# Patient Record
Sex: FEMALE | Race: WHITE | Employment: UNEMPLOYED | URBAN - METROPOLITAN AREA
[De-identification: names, ages, dates, MRNs, and addresses within clinical notes are randomized per-mention and may not be internally consistent; named-entity substitution may affect disease eponyms.]

---

## 2019-12-18 ENCOUNTER — HOSPITAL ENCOUNTER (OUTPATIENT)
Dept: GENERAL RADIOLOGY | Age: 74
Discharge: HOME | End: 2019-12-18
Attending: FAMILY MEDICINE
Payer: COMMERCIAL

## 2019-12-18 DIAGNOSIS — R05.9 COUGH: ICD-10-CM

## 2019-12-18 PROCEDURE — 71046 X-RAY EXAM CHEST 2 VIEWS: CPT

## 2021-07-22 PROBLEM — D68.69 SECONDARY HYPERCOAGULABLE STATE (HCC): Status: ACTIVE | Noted: 2021-07-22

## 2021-07-22 PROBLEM — D69.2 SENILE PURPURA (HCC): Status: ACTIVE | Noted: 2021-07-22

## 2022-03-19 PROBLEM — D68.69 SECONDARY HYPERCOAGULABLE STATE (HCC): Status: ACTIVE | Noted: 2021-07-22

## 2022-03-19 PROBLEM — D69.2 SENILE PURPURA: Status: ACTIVE | Noted: 2021-07-22

## 2022-06-07 PROBLEM — D69.2 SENILE PURPURA (HCC): Status: RESOLVED | Noted: 2021-07-22 | Resolved: 2022-06-07

## 2022-06-07 PROBLEM — D68.69 SECONDARY HYPERCOAGULABLE STATE (HCC): Status: RESOLVED | Noted: 2021-07-22 | Resolved: 2022-06-07

## 2022-06-07 PROBLEM — D69.2 SENILE PURPURA: Status: RESOLVED | Noted: 2021-07-22 | Resolved: 2022-06-07

## 2023-07-26 PROBLEM — D69.2 SENILE PURPURA: Status: ACTIVE | Noted: 2021-07-22

## 2025-02-10 ENCOUNTER — HOSPITAL ENCOUNTER (OUTPATIENT)
Facility: HOSPITAL | Age: 80
Discharge: HOME | End: 2025-02-13
Payer: COMMERCIAL

## 2025-02-10 DIAGNOSIS — R07.81 PLEURITIC CHEST PAIN: ICD-10-CM

## 2025-02-10 PROCEDURE — 71046 X-RAY EXAM CHEST 2 VIEWS: CPT

## 2025-02-13 NOTE — RESULT ENCOUNTER NOTE
Feels much better on the Abx and low dose prednisone  Will repeat cxr in 3-4 weeks.   If changes not improved will get ct

## 2025-02-27 ENCOUNTER — HOSPITAL ENCOUNTER (OUTPATIENT)
Facility: HOSPITAL | Age: 80
Discharge: HOME | End: 2025-02-27
Payer: COMMERCIAL

## 2025-02-27 DIAGNOSIS — J18.9 PNEUMONIA OF RIGHT MIDDLE LOBE DUE TO INFECTIOUS ORGANISM: ICD-10-CM

## 2025-02-27 PROCEDURE — 71046 X-RAY EXAM CHEST 2 VIEWS: CPT

## 2025-07-29 ENCOUNTER — APPOINTMENT (OUTPATIENT)
Facility: HOSPITAL | Age: 80
DRG: 024 | End: 2025-07-29
Payer: COMMERCIAL

## 2025-07-29 ENCOUNTER — HOSPITAL ENCOUNTER (INPATIENT)
Facility: HOSPITAL | Age: 80
LOS: 2 days | Discharge: HOME | DRG: 024 | End: 2025-07-31
Attending: EMERGENCY MEDICINE | Admitting: STUDENT IN AN ORGANIZED HEALTH CARE EDUCATION/TRAINING PROGRAM
Payer: COMMERCIAL

## 2025-07-29 ENCOUNTER — HOSPITAL ENCOUNTER (INPATIENT)
Facility: HOSPITAL | Age: 80
Discharge: HOME | DRG: 024 | End: 2025-08-01
Attending: PSYCHIATRY & NEUROLOGY
Payer: COMMERCIAL

## 2025-07-29 ENCOUNTER — ANESTHESIA EVENT (OUTPATIENT)
Facility: HOSPITAL | Age: 80
DRG: 024 | End: 2025-07-29
Payer: COMMERCIAL

## 2025-07-29 ENCOUNTER — ANESTHESIA (OUTPATIENT)
Facility: HOSPITAL | Age: 80
DRG: 024 | End: 2025-07-29
Payer: COMMERCIAL

## 2025-07-29 VITALS
DIASTOLIC BLOOD PRESSURE: 66 MMHG | RESPIRATION RATE: 12 BRPM | TEMPERATURE: 97.7 F | OXYGEN SATURATION: 93 % | SYSTOLIC BLOOD PRESSURE: 106 MMHG | HEART RATE: 59 BPM

## 2025-07-29 DIAGNOSIS — I63.9 STROKE (HCC): ICD-10-CM

## 2025-07-29 DIAGNOSIS — I63.411 CEREBROVASCULAR ACCIDENT (CVA) DUE TO EMBOLISM OF RIGHT MIDDLE CEREBRAL ARTERY (HCC): ICD-10-CM

## 2025-07-29 DIAGNOSIS — I63.9 CEREBROVASCULAR ACCIDENT (CVA), UNSPECIFIED MECHANISM (HCC): Primary | ICD-10-CM

## 2025-07-29 PROBLEM — I61.9 CVA (CEREBROVASCULAR ACCIDENT DUE TO INTRACEREBRAL HEMORRHAGE) (HCC): Status: ACTIVE | Noted: 2025-07-29

## 2025-07-29 LAB
ALBUMIN SERPL-MCNC: 3.4 G/DL (ref 3.5–5.2)
ALBUMIN/GLOB SERPL: 1.2 (ref 1.1–2.2)
ALP SERPL-CCNC: ABNORMAL U/L (ref 35–104)
ALT SERPL-CCNC: ABNORMAL U/L (ref 10–50)
ANION GAP SERPL CALC-SCNC: 10 MMOL/L (ref 2–14)
AST SERPL-CCNC: ABNORMAL U/L (ref 10–35)
BASOPHILS # BLD: 0.03 K/UL (ref 0–0.1)
BASOPHILS NFR BLD: 0.4 % (ref 0–1)
BILIRUB SERPL-MCNC: 0.5 MG/DL (ref 0–1.2)
BUN SERPL-MCNC: 18 MG/DL (ref 8–23)
BUN/CREAT SERPL: 22 (ref 12–20)
CALCIUM SERPL-MCNC: 8.6 MG/DL (ref 8.8–10.2)
CHLORIDE SERPL-SCNC: 101 MMOL/L (ref 98–107)
CO2 SERPL-SCNC: 23 MMOL/L (ref 20–29)
COMMENT:: NORMAL
COMMENT:: NORMAL
CREAT SERPL-MCNC: 0.82 MG/DL (ref 0.6–1)
DIFFERENTIAL METHOD BLD: ABNORMAL
EOSINOPHIL # BLD: 0.08 K/UL (ref 0–0.4)
EOSINOPHIL NFR BLD: 1.1 % (ref 0–7)
ERYTHROCYTE [DISTWIDTH] IN BLOOD BY AUTOMATED COUNT: 12.6 % (ref 11.5–14.5)
GLOBULIN SER CALC-MCNC: 2.8 G/DL (ref 2–4)
GLUCOSE SERPL-MCNC: 122 MG/DL (ref 65–100)
HCT VFR BLD AUTO: 39.9 % (ref 35–47)
HGB BLD-MCNC: 13.1 G/DL (ref 11.5–16)
IMM GRANULOCYTES # BLD AUTO: 0.03 K/UL (ref 0–0.04)
IMM GRANULOCYTES NFR BLD AUTO: 0.4 % (ref 0–0.5)
INR PPP: 1 (ref 0.9–1.1)
LYMPHOCYTES # BLD: 2.78 K/UL (ref 0.8–3.5)
LYMPHOCYTES NFR BLD: 37.8 % (ref 12–49)
MCH RBC QN AUTO: 33.9 PG (ref 26–34)
MCHC RBC AUTO-ENTMCNC: 32.8 G/DL (ref 30–36.5)
MCV RBC AUTO: 103.4 FL (ref 80–99)
MONOCYTES # BLD: 0.49 K/UL (ref 0–1)
MONOCYTES NFR BLD: 6.7 % (ref 5–13)
NEUTS SEG # BLD: 3.94 K/UL (ref 1.8–8)
NEUTS SEG NFR BLD: 53.6 % (ref 32–75)
NRBC # BLD: 0 K/UL (ref 0–0.01)
NRBC BLD-RTO: 0 PER 100 WBC
PLATELET # BLD AUTO: 204 K/UL (ref 150–400)
PMV BLD AUTO: 9.4 FL (ref 8.9–12.9)
POTASSIUM SERPL-SCNC: ABNORMAL MMOL/L (ref 3.5–5.1)
PROT SERPL-MCNC: 6.1 G/DL (ref 6.4–8.3)
PROTHROMBIN TIME: 10.8 SEC (ref 9.2–11.2)
RBC # BLD AUTO: 3.86 M/UL (ref 3.8–5.2)
SODIUM SERPL-SCNC: 134 MMOL/L (ref 136–145)
SPECIMEN HOLD: NORMAL
SPECIMEN HOLD: NORMAL
TROPONIN T SERPL HS-MCNC: 54.4 NG/L (ref 0–14)
WBC # BLD AUTO: 7.4 K/UL (ref 3.6–11)

## 2025-07-29 PROCEDURE — 6360000004 HC RX CONTRAST MEDICATION: Performed by: PSYCHIATRY & NEUROLOGY

## 2025-07-29 PROCEDURE — 96374 THER/PROPH/DIAG INJ IV PUSH: CPT

## 2025-07-29 PROCEDURE — 71045 X-RAY EXAM CHEST 1 VIEW: CPT

## 2025-07-29 PROCEDURE — 84484 ASSAY OF TROPONIN QUANT: CPT

## 2025-07-29 PROCEDURE — 6360000004 HC RX CONTRAST MEDICATION: Performed by: EMERGENCY MEDICINE

## 2025-07-29 PROCEDURE — 2580000003 HC RX 258: Performed by: EMERGENCY MEDICINE

## 2025-07-29 PROCEDURE — 6360000002 HC RX W HCPCS

## 2025-07-29 PROCEDURE — B31R1ZZ FLUOROSCOPY OF INTRACRANIAL ARTERIES USING LOW OSMOLAR CONTRAST: ICD-10-PCS | Performed by: PSYCHIATRY & NEUROLOGY

## 2025-07-29 PROCEDURE — 70450 CT HEAD/BRAIN W/O DYE: CPT

## 2025-07-29 PROCEDURE — 85610 PROTHROMBIN TIME: CPT

## 2025-07-29 PROCEDURE — 2500000003 HC RX 250 WO HCPCS: Performed by: EMERGENCY MEDICINE

## 2025-07-29 PROCEDURE — 3E03317 INTRODUCTION OF OTHER THROMBOLYTIC INTO PERIPHERAL VEIN, PERCUTANEOUS APPROACH: ICD-10-PCS | Performed by: HOSPITALIST

## 2025-07-29 PROCEDURE — 4A03X5D MEASUREMENT OF ARTERIAL FLOW, INTRACRANIAL, EXTERNAL APPROACH: ICD-10-PCS | Performed by: HOSPITALIST

## 2025-07-29 PROCEDURE — 6360000002 HC RX W HCPCS: Performed by: EMERGENCY MEDICINE

## 2025-07-29 PROCEDURE — 61645 PERQ ART M-THROMBECT &/NFS: CPT | Performed by: PSYCHIATRY & NEUROLOGY

## 2025-07-29 PROCEDURE — 3700000001 HC ADD 15 MINUTES (ANESTHESIA): Performed by: PSYCHIATRY & NEUROLOGY

## 2025-07-29 PROCEDURE — 36217 PLACE CATHETER IN ARTERY: CPT

## 2025-07-29 PROCEDURE — 80053 COMPREHEN METABOLIC PANEL: CPT

## 2025-07-29 PROCEDURE — 6360000002 HC RX W HCPCS: Performed by: NURSE ANESTHETIST, CERTIFIED REGISTERED

## 2025-07-29 PROCEDURE — 85025 COMPLETE CBC W/AUTO DIFF WBC: CPT

## 2025-07-29 PROCEDURE — 3700000000 HC ANESTHESIA ATTENDED CARE: Performed by: PSYCHIATRY & NEUROLOGY

## 2025-07-29 PROCEDURE — 0042T CT BRAIN PERFUSION: CPT

## 2025-07-29 PROCEDURE — C1769 GUIDE WIRE: HCPCS

## 2025-07-29 PROCEDURE — 6360000002 HC RX W HCPCS: Performed by: PSYCHIATRY & NEUROLOGY

## 2025-07-29 PROCEDURE — 99285 EMERGENCY DEPT VISIT HI MDM: CPT

## 2025-07-29 PROCEDURE — 2580000003 HC RX 258: Performed by: PSYCHIATRY & NEUROLOGY

## 2025-07-29 PROCEDURE — 99223 1ST HOSP IP/OBS HIGH 75: CPT | Performed by: PSYCHIATRY & NEUROLOGY

## 2025-07-29 PROCEDURE — 93005 ELECTROCARDIOGRAM TRACING: CPT

## 2025-07-29 PROCEDURE — 70498 CT ANGIOGRAPHY NECK: CPT

## 2025-07-29 PROCEDURE — 2000000000 HC ICU R&B

## 2025-07-29 PROCEDURE — 03CG3ZZ EXTIRPATION OF MATTER FROM INTRACRANIAL ARTERY, PERCUTANEOUS APPROACH: ICD-10-PCS | Performed by: PSYCHIATRY & NEUROLOGY

## 2025-07-29 PROCEDURE — 92977 HC THROMBOLYSIS/CORONARY: CPT

## 2025-07-29 RX ORDER — ONDANSETRON 2 MG/ML
INJECTION INTRAMUSCULAR; INTRAVENOUS
Status: DISCONTINUED | OUTPATIENT
Start: 2025-07-29 | End: 2025-07-29 | Stop reason: SDUPTHER

## 2025-07-29 RX ORDER — SODIUM CHLORIDE 9 MG/ML
INJECTION, SOLUTION INTRAVENOUS PRN
Status: DISCONTINUED | OUTPATIENT
Start: 2025-07-29 | End: 2025-07-31 | Stop reason: HOSPADM

## 2025-07-29 RX ORDER — ONDANSETRON 2 MG/ML
4 INJECTION INTRAMUSCULAR; INTRAVENOUS EVERY 6 HOURS PRN
Status: DISCONTINUED | OUTPATIENT
Start: 2025-07-29 | End: 2025-07-31 | Stop reason: HOSPADM

## 2025-07-29 RX ORDER — ENOXAPARIN SODIUM 100 MG/ML
30 INJECTION SUBCUTANEOUS DAILY
Status: DISCONTINUED | OUTPATIENT
Start: 2025-07-30 | End: 2025-07-31 | Stop reason: HOSPADM

## 2025-07-29 RX ORDER — ASPIRIN 81 MG/1
81 TABLET, CHEWABLE ORAL DAILY
Status: DISCONTINUED | OUTPATIENT
Start: 2025-07-30 | End: 2025-07-31 | Stop reason: HOSPADM

## 2025-07-29 RX ORDER — SODIUM CHLORIDE 9 MG/ML
INJECTION, SOLUTION INTRAVENOUS CONTINUOUS
Status: DISCONTINUED | OUTPATIENT
Start: 2025-07-29 | End: 2025-07-29

## 2025-07-29 RX ORDER — SODIUM CHLORIDE 0.9 % (FLUSH) 0.9 %
5-40 SYRINGE (ML) INJECTION EVERY 12 HOURS SCHEDULED
Status: DISCONTINUED | OUTPATIENT
Start: 2025-07-29 | End: 2025-07-30 | Stop reason: SDUPTHER

## 2025-07-29 RX ORDER — LIDOCAINE HYDROCHLORIDE 10 MG/ML
INJECTION, SOLUTION EPIDURAL; INFILTRATION; INTRACAUDAL; PERINEURAL PRN
Status: COMPLETED | OUTPATIENT
Start: 2025-07-29 | End: 2025-07-29

## 2025-07-29 RX ORDER — IOPAMIDOL 755 MG/ML
100 INJECTION, SOLUTION INTRAVASCULAR
Status: DISCONTINUED | OUTPATIENT
Start: 2025-07-29 | End: 2025-07-31 | Stop reason: HOSPADM

## 2025-07-29 RX ORDER — IOPAMIDOL 612 MG/ML
INJECTION, SOLUTION INTRAVASCULAR PRN
Status: COMPLETED | OUTPATIENT
Start: 2025-07-29 | End: 2025-07-29

## 2025-07-29 RX ORDER — ONDANSETRON 2 MG/ML
4 INJECTION INTRAMUSCULAR; INTRAVENOUS ONCE
Status: COMPLETED | OUTPATIENT
Start: 2025-07-29 | End: 2025-07-29

## 2025-07-29 RX ORDER — SODIUM CHLORIDE 0.9 % (FLUSH) 0.9 %
10 SYRINGE (ML) INJECTION ONCE
Status: COMPLETED | OUTPATIENT
Start: 2025-07-29 | End: 2025-07-29

## 2025-07-29 RX ORDER — SODIUM CHLORIDE 0.9 % (FLUSH) 0.9 %
5-40 SYRINGE (ML) INJECTION PRN
Status: DISCONTINUED | OUTPATIENT
Start: 2025-07-29 | End: 2025-07-31 | Stop reason: HOSPADM

## 2025-07-29 RX ORDER — POLYETHYLENE GLYCOL 3350 17 G/17G
17 POWDER, FOR SOLUTION ORAL DAILY PRN
Status: DISCONTINUED | OUTPATIENT
Start: 2025-07-29 | End: 2025-07-31 | Stop reason: HOSPADM

## 2025-07-29 RX ORDER — SODIUM CHLORIDE 0.9 % (FLUSH) 0.9 %
5-40 SYRINGE (ML) INJECTION PRN
Status: DISCONTINUED | OUTPATIENT
Start: 2025-07-29 | End: 2025-07-30 | Stop reason: SDUPTHER

## 2025-07-29 RX ORDER — IOPAMIDOL 755 MG/ML
100 INJECTION, SOLUTION INTRAVASCULAR
Status: COMPLETED | OUTPATIENT
Start: 2025-07-29 | End: 2025-07-29

## 2025-07-29 RX ORDER — ASPIRIN 300 MG/1
300 SUPPOSITORY RECTAL DAILY
Status: DISCONTINUED | OUTPATIENT
Start: 2025-07-30 | End: 2025-07-31 | Stop reason: HOSPADM

## 2025-07-29 RX ORDER — 0.9 % SODIUM CHLORIDE 0.9 %
1000 INTRAVENOUS SOLUTION INTRAVENOUS ONCE
Status: COMPLETED | OUTPATIENT
Start: 2025-07-29 | End: 2025-07-29

## 2025-07-29 RX ORDER — FENTANYL CITRATE 50 UG/ML
INJECTION, SOLUTION INTRAMUSCULAR; INTRAVENOUS
Status: DISCONTINUED | OUTPATIENT
Start: 2025-07-29 | End: 2025-07-29 | Stop reason: SDUPTHER

## 2025-07-29 RX ORDER — ONDANSETRON 2 MG/ML
4 INJECTION INTRAMUSCULAR; INTRAVENOUS EVERY 6 HOURS PRN
Status: DISCONTINUED | OUTPATIENT
Start: 2025-07-29 | End: 2025-07-30 | Stop reason: SDUPTHER

## 2025-07-29 RX ORDER — ONDANSETRON 2 MG/ML
4 INJECTION INTRAMUSCULAR; INTRAVENOUS
Status: DISCONTINUED | OUTPATIENT
Start: 2025-07-29 | End: 2025-07-29 | Stop reason: SDUPTHER

## 2025-07-29 RX ORDER — ATORVASTATIN CALCIUM 40 MG/1
80 TABLET, FILM COATED ORAL NIGHTLY
Status: DISCONTINUED | OUTPATIENT
Start: 2025-07-30 | End: 2025-07-31 | Stop reason: HOSPADM

## 2025-07-29 RX ORDER — SODIUM CHLORIDE 0.9 % (FLUSH) 0.9 %
5-40 SYRINGE (ML) INJECTION EVERY 12 HOURS SCHEDULED
Status: DISCONTINUED | OUTPATIENT
Start: 2025-07-30 | End: 2025-07-31 | Stop reason: HOSPADM

## 2025-07-29 RX ORDER — ONDANSETRON 4 MG/1
4 TABLET, ORALLY DISINTEGRATING ORAL EVERY 8 HOURS PRN
Status: DISCONTINUED | OUTPATIENT
Start: 2025-07-29 | End: 2025-07-31 | Stop reason: HOSPADM

## 2025-07-29 RX ADMIN — FENTANYL CITRATE 25 MCG: 50 INJECTION INTRAMUSCULAR; INTRAVENOUS at 21:25

## 2025-07-29 RX ADMIN — Medication 12 MG: at 20:37

## 2025-07-29 RX ADMIN — SODIUM CHLORIDE, PRESERVATIVE FREE 10 ML: 5 INJECTION INTRAVENOUS at 20:38

## 2025-07-29 RX ADMIN — ONDANSETRON 4 MG: 2 INJECTION, SOLUTION INTRAMUSCULAR; INTRAVENOUS at 20:45

## 2025-07-29 RX ADMIN — HEPARIN SODIUM 100 ML: 1000 INJECTION INTRAVENOUS; SUBCUTANEOUS at 21:29

## 2025-07-29 RX ADMIN — LIDOCAINE HYDROCHLORIDE 6 ML: 10 INJECTION, SOLUTION EPIDURAL; INFILTRATION; INTRACAUDAL; PERINEURAL at 21:41

## 2025-07-29 RX ADMIN — IOPAMIDOL 45 ML: 612 INJECTION, SOLUTION INTRAVENOUS at 21:39

## 2025-07-29 RX ADMIN — Medication 10 ML: at 20:36

## 2025-07-29 RX ADMIN — SODIUM CHLORIDE 600 ML: 9 INJECTION, SOLUTION INTRAVENOUS at 21:21

## 2025-07-29 RX ADMIN — ONDANSETRON 4 MG: 2 INJECTION, SOLUTION INTRAMUSCULAR; INTRAVENOUS at 23:21

## 2025-07-29 RX ADMIN — IOPAMIDOL 40 ML: 755 INJECTION, SOLUTION INTRAVENOUS at 20:28

## 2025-07-29 RX ADMIN — SODIUM CHLORIDE: 9 INJECTION, SOLUTION INTRAVENOUS at 21:21

## 2025-07-29 RX ADMIN — FENTANYL CITRATE 25 MCG: 50 INJECTION INTRAMUSCULAR; INTRAVENOUS at 21:36

## 2025-07-29 RX ADMIN — IOPAMIDOL 80 ML: 755 INJECTION, SOLUTION INTRAVENOUS at 20:28

## 2025-07-29 RX ADMIN — ONDANSETRON 4 MG: 2 INJECTION INTRAMUSCULAR; INTRAVENOUS at 22:00

## 2025-07-29 ASSESSMENT — PAIN - FUNCTIONAL ASSESSMENT: PAIN_FUNCTIONAL_ASSESSMENT: ADULT NONVERBAL PAIN SCALE (NPVS)

## 2025-07-30 ENCOUNTER — APPOINTMENT (OUTPATIENT)
Facility: HOSPITAL | Age: 80
DRG: 024 | End: 2025-07-30
Payer: COMMERCIAL

## 2025-07-30 ENCOUNTER — APPOINTMENT (OUTPATIENT)
Facility: HOSPITAL | Age: 80
DRG: 024 | End: 2025-07-30
Attending: STUDENT IN AN ORGANIZED HEALTH CARE EDUCATION/TRAINING PROGRAM
Payer: COMMERCIAL

## 2025-07-30 LAB
ANION GAP SERPL CALC-SCNC: 11 MMOL/L (ref 2–14)
ANION GAP SERPL CALC-SCNC: 15 MMOL/L (ref 2–14)
BUN SERPL-MCNC: 14 MG/DL (ref 8–23)
BUN SERPL-MCNC: 15 MG/DL (ref 8–23)
BUN/CREAT SERPL: 19 (ref 12–20)
CALCIUM SERPL-MCNC: 8.7 MG/DL (ref 8.8–10.2)
CALCIUM SERPL-MCNC: 9.3 MG/DL (ref 8.8–10.2)
CHLORIDE SERPL-SCNC: 102 MMOL/L (ref 98–107)
CHLORIDE SERPL-SCNC: 99 MMOL/L (ref 98–107)
CHOLEST SERPL-MCNC: 163 MG/DL (ref 0–200)
CO2 SERPL-SCNC: 19 MMOL/L (ref 20–29)
CO2 SERPL-SCNC: 25 MMOL/L (ref 20–29)
CREAT SERPL-MCNC: 0.68 MG/DL (ref 0.6–1)
CREAT SERPL-MCNC: 0.71 MG/DL (ref 0.6–1)
ECHO AO ROOT DIAM: 3 CM
ECHO AO ROOT INDEX: 2.05 CM/M2
ECHO AR MAX VEL PISA: 3.3 M/S
ECHO AV MEAN GRADIENT: 11 MMHG
ECHO AV MEAN VELOCITY: 1.6 M/S
ECHO AV PEAK GRADIENT: 18 MMHG
ECHO AV PEAK VELOCITY: 2.1 M/S
ECHO AV REGURGITANT PHT: 626.9 MS
ECHO AV VELOCITY RATIO: 0.48
ECHO AV VTI: 43.2 CM
ECHO BSA: 1.45 M2
ECHO LA DIAMETER INDEX: 2.81 CM/M2
ECHO LA DIAMETER: 4.1 CM
ECHO LA TO AORTIC ROOT RATIO: 1.37
ECHO LV E' LATERAL VELOCITY: 7.52 CM/S
ECHO LV E' SEPTAL VELOCITY: 7.2 CM/S
ECHO LV EF PHYSICIAN: 55 %
ECHO LV FRACTIONAL SHORTENING: 38 % (ref 28–44)
ECHO LV INTERNAL DIMENSION DIASTOLE INDEX: 2.53 CM/M2
ECHO LV INTERNAL DIMENSION DIASTOLIC: 3.7 CM (ref 3.9–5.3)
ECHO LV INTERNAL DIMENSION SYSTOLIC INDEX: 1.58 CM/M2
ECHO LV INTERNAL DIMENSION SYSTOLIC: 2.3 CM
ECHO LV IVSD: 1.2 CM (ref 0.6–0.9)
ECHO LV MASS 2D: 120.8 G (ref 67–162)
ECHO LV MASS INDEX 2D: 82.7 G/M2 (ref 43–95)
ECHO LV POSTERIOR WALL DIASTOLIC: 0.9 CM (ref 0.6–0.9)
ECHO LV RELATIVE WALL THICKNESS RATIO: 0.49
ECHO LVOT AV VTI INDEX: 0.47
ECHO LVOT MEAN GRADIENT: 2 MMHG
ECHO LVOT PEAK GRADIENT: 4 MMHG
ECHO LVOT PEAK VELOCITY: 1 M/S
ECHO LVOT VTI: 20.3 CM
ECHO MV A VELOCITY: 0.47 M/S
ECHO MV AREA PHT: 4.4 CM2
ECHO MV E DECELERATION TIME (DT): 171.9 MS
ECHO MV E VELOCITY: 0.53 M/S
ECHO MV E/A RATIO: 1.13
ECHO MV E/E' LATERAL: 7.05
ECHO MV E/E' RATIO (AVERAGED): 7.2
ECHO MV E/E' SEPTAL: 7.36
ECHO MV PRESSURE HALF TIME (PHT): 49.8 MS
ECHO PV MAX VELOCITY: 0.7 M/S
ECHO PV PEAK GRADIENT: 2 MMHG
ECHO RV TAPSE: 1.9 CM (ref 1.7–?)
ECHO TV REGURGITANT MAX VELOCITY: 2.87 M/S
ECHO TV REGURGITANT PEAK GRADIENT: 33 MMHG
EKG ATRIAL RATE: 53 BPM
EKG ATRIAL RATE: 77 BPM
EKG DIAGNOSIS: NORMAL
EKG DIAGNOSIS: NORMAL
EKG P AXIS: 79 DEGREES
EKG P AXIS: 85 DEGREES
EKG P-R INTERVAL: 150 MS
EKG P-R INTERVAL: 158 MS
EKG Q-T INTERVAL: 332 MS
EKG Q-T INTERVAL: 488 MS
EKG QRS DURATION: 62 MS
EKG QRS DURATION: 70 MS
EKG QTC CALCULATION (BAZETT): 375 MS
EKG QTC CALCULATION (BAZETT): 457 MS
EKG R AXIS: 23 DEGREES
EKG R AXIS: 34 DEGREES
EKG T AXIS: -16 DEGREES
EKG T AXIS: 261 DEGREES
EKG VENTRICULAR RATE: 53 BPM
EKG VENTRICULAR RATE: 77 BPM
ERYTHROCYTE [DISTWIDTH] IN BLOOD BY AUTOMATED COUNT: 12.7 % (ref 11.5–14.5)
EST. AVERAGE GLUCOSE BLD GHB EST-MCNC: 117 MG/DL
GLUCOSE SERPL-MCNC: 125 MG/DL (ref 65–100)
GLUCOSE SERPL-MCNC: 95 MG/DL (ref 65–100)
HBA1C MFR BLD: 5.7 % (ref 4–5.6)
HCT VFR BLD AUTO: 38.7 % (ref 35–47)
HDLC SERPL-MCNC: 90 MG/DL (ref 40–60)
HDLC SERPL: 1.8
HGB BLD-MCNC: 12.4 G/DL (ref 11.5–16)
LDLC SERPL CALC-MCNC: 66 MG/DL
MAGNESIUM SERPL-MCNC: 2.3 MG/DL (ref 1.6–2.4)
MCH RBC QN AUTO: 34 PG (ref 26–34)
MCHC RBC AUTO-ENTMCNC: 32 G/DL (ref 30–36.5)
MCV RBC AUTO: 106 FL (ref 80–99)
NRBC # BLD: 0 K/UL (ref 0–0.01)
NRBC BLD-RTO: 0 PER 100 WBC
PHOSPHATE SERPL-MCNC: 3.3 MG/DL (ref 2.4–4.5)
PLATELET # BLD AUTO: 195 K/UL (ref 150–400)
PMV BLD AUTO: 9.5 FL (ref 8.9–12.9)
POTASSIUM SERPL-SCNC: 3.7 MMOL/L (ref 3.5–5.1)
POTASSIUM SERPL-SCNC: 4.3 MMOL/L (ref 3.5–5.1)
RBC # BLD AUTO: 3.65 M/UL (ref 3.8–5.2)
SODIUM SERPL-SCNC: 135 MMOL/L (ref 136–145)
SODIUM SERPL-SCNC: 136 MMOL/L (ref 136–145)
TRIGL SERPL-MCNC: 38 MG/DL (ref 0–150)
TROPONIN T SERPL HS-MCNC: 186 NG/L (ref 0–14)
TROPONIN T SERPL HS-MCNC: 202 NG/L (ref 0–14)
TROPONIN T SERPL HS-MCNC: 98.8 NG/L (ref 0–14)
VLDLC SERPL CALC-MCNC: 8 MG/DL
WBC # BLD AUTO: 12.3 K/UL (ref 3.6–11)

## 2025-07-30 PROCEDURE — 84484 ASSAY OF TROPONIN QUANT: CPT

## 2025-07-30 PROCEDURE — 6360000002 HC RX W HCPCS: Performed by: STUDENT IN AN ORGANIZED HEALTH CARE EDUCATION/TRAINING PROGRAM

## 2025-07-30 PROCEDURE — 97165 OT EVAL LOW COMPLEX 30 MIN: CPT

## 2025-07-30 PROCEDURE — 6370000000 HC RX 637 (ALT 250 FOR IP): Performed by: STUDENT IN AN ORGANIZED HEALTH CARE EDUCATION/TRAINING PROGRAM

## 2025-07-30 PROCEDURE — 85027 COMPLETE CBC AUTOMATED: CPT

## 2025-07-30 PROCEDURE — 80048 BASIC METABOLIC PNL TOTAL CA: CPT

## 2025-07-30 PROCEDURE — 97530 THERAPEUTIC ACTIVITIES: CPT

## 2025-07-30 PROCEDURE — 83735 ASSAY OF MAGNESIUM: CPT

## 2025-07-30 PROCEDURE — 2000000000 HC ICU R&B

## 2025-07-30 PROCEDURE — 80061 LIPID PANEL: CPT

## 2025-07-30 PROCEDURE — 2500000003 HC RX 250 WO HCPCS: Performed by: STUDENT IN AN ORGANIZED HEALTH CARE EDUCATION/TRAINING PROGRAM

## 2025-07-30 PROCEDURE — 92610 EVALUATE SWALLOWING FUNCTION: CPT

## 2025-07-30 PROCEDURE — 97116 GAIT TRAINING THERAPY: CPT

## 2025-07-30 PROCEDURE — 6370000000 HC RX 637 (ALT 250 FOR IP): Performed by: INTERNAL MEDICINE

## 2025-07-30 PROCEDURE — 93306 TTE W/DOPPLER COMPLETE: CPT

## 2025-07-30 PROCEDURE — 97161 PT EVAL LOW COMPLEX 20 MIN: CPT

## 2025-07-30 PROCEDURE — 84100 ASSAY OF PHOSPHORUS: CPT

## 2025-07-30 PROCEDURE — 83036 HEMOGLOBIN GLYCOSYLATED A1C: CPT

## 2025-07-30 PROCEDURE — 92522 EVALUATE SPEECH PRODUCTION: CPT

## 2025-07-30 PROCEDURE — 99231 SBSQ HOSP IP/OBS SF/LOW 25: CPT | Performed by: PSYCHIATRY & NEUROLOGY

## 2025-07-30 PROCEDURE — 93005 ELECTROCARDIOGRAM TRACING: CPT | Performed by: INTERNAL MEDICINE

## 2025-07-30 PROCEDURE — 70551 MRI BRAIN STEM W/O DYE: CPT

## 2025-07-30 PROCEDURE — 2500000003 HC RX 250 WO HCPCS

## 2025-07-30 PROCEDURE — 97535 SELF CARE MNGMENT TRAINING: CPT

## 2025-07-30 PROCEDURE — 99291 CRITICAL CARE FIRST HOUR: CPT

## 2025-07-30 PROCEDURE — 70450 CT HEAD/BRAIN W/O DYE: CPT

## 2025-07-30 RX ORDER — ACETAMINOPHEN 325 MG/1
650 TABLET ORAL EVERY 4 HOURS PRN
Status: DISCONTINUED | OUTPATIENT
Start: 2025-07-30 | End: 2025-07-31 | Stop reason: HOSPADM

## 2025-07-30 RX ADMIN — SODIUM CHLORIDE, PRESERVATIVE FREE 10 ML: 5 INJECTION INTRAVENOUS at 09:22

## 2025-07-30 RX ADMIN — ENOXAPARIN SODIUM 30 MG: 100 INJECTION SUBCUTANEOUS at 21:04

## 2025-07-30 RX ADMIN — ACETAMINOPHEN 650 MG: 325 TABLET ORAL at 17:18

## 2025-07-30 RX ADMIN — SODIUM CHLORIDE, PRESERVATIVE FREE 10 ML: 5 INJECTION INTRAVENOUS at 01:15

## 2025-07-30 RX ADMIN — SODIUM CHLORIDE, PRESERVATIVE FREE 10 ML: 5 INJECTION INTRAVENOUS at 21:01

## 2025-07-30 RX ADMIN — SODIUM CHLORIDE, PRESERVATIVE FREE 10 ML: 5 INJECTION INTRAVENOUS at 01:14

## 2025-07-30 RX ADMIN — ASPIRIN 81 MG: 81 TABLET, CHEWABLE ORAL at 21:03

## 2025-07-30 RX ADMIN — ATORVASTATIN CALCIUM 80 MG: 40 TABLET, FILM COATED ORAL at 01:15

## 2025-07-30 RX ADMIN — ATORVASTATIN CALCIUM 80 MG: 40 TABLET, FILM COATED ORAL at 21:00

## 2025-07-30 ASSESSMENT — PAIN DESCRIPTION - LOCATION: LOCATION: HEAD

## 2025-07-30 ASSESSMENT — PAIN SCALES - GENERAL
PAINLEVEL_OUTOF10: 0
PAINLEVEL_OUTOF10: 2

## 2025-07-30 ASSESSMENT — PAIN DESCRIPTION - DESCRIPTORS: DESCRIPTORS: ACHING

## 2025-07-30 NOTE — PROGRESS NOTES
PROGRESS NOTE       PATIENT IDENTIFICATION:  Admit Date: 7/29/2025   Length of Stay: 1 days  Admission Diagnoses: CVA (cerebrovascular accident due to intracerebral hemorrhage) (HCC) [I61.9]  Cerebrovascular accident (CVA), unspecified mechanism (HCC) [I63.9]  Patient Name: Brittney De La Fuente         ASSESSMENT:  Cryptogenic R MCA stroke s/p Tnk+EVT  Mild FMD      PLAN:  - MRI pend  - TTE pend  - exam improved, BP stable. Ok for NSTU  - neuro following  - Please call witrh ?'s      HPI/SUBJECTIVE:  No complaints/OV issues over last 24hrs    OBJECTIVE:  /61   Pulse 68   Temp 97.6 °F (36.4 °C) (Oral)   Resp 17   Wt 47.2 kg (104 lb 0.9 oz)   SpO2 97%   BMI 18.14 kg/m²         Physical Exam:  NAD  Pulses intact    A/ox3, speech/lang intact  CN 2-12 intact. VF intact  Motor: 5/5 R side, drift LUE  SILT  FTN intact    Labs:  Lab Results   Component Value Date    WBC 12.3 (H) 07/30/2025    HGB 12.4 07/30/2025    HCT 38.7 07/30/2025     07/30/2025    CHOL 199 07/30/2024    TRIG 51 07/30/2024     07/30/2024    ALT Hemolyzed, Recollection Recommended 07/29/2025    AST Hemolyzed, Recollection Recommended 07/29/2025     (L) 07/29/2025    K Hemolyzed, Recollection Recommended 07/29/2025     07/29/2025    BUN 18 07/29/2025    CO2 23 07/29/2025    TSH 0.992 07/30/2024    INR 1.0 07/29/2025         Radiology/Imaging:  DSA  1. Angiographic study demonstrates acute occlusion of right middle cerebral  artery, mid M1 segment.     2. Successful mechanical thrombectomy with TICI 2C recanalization of the right  MCA territory.       Meds:  Current Facility-Administered Medications   Medication Dose Route Frequency    sodium chloride flush 0.9 % injection 5-40 mL  5-40 mL IntraVENous 2 times per day    sodium chloride flush 0.9 % injection 5-40 mL  5-40 mL IntraVENous PRN    0.9 % sodium chloride infusion   IntraVENous PRN    dextrose bolus 10% 125 mL  125 mL IntraVENous PRN    sodium chloride flush 0.9

## 2025-07-30 NOTE — PROGRESS NOTES
4 Eyes Skin Assessment     NAME:  Brittney De La Fuente  YOB: 1945  MEDICAL RECORD NUMBER:  [id number]    The patient is being assessed for  Admission    I agree that at least one RN has performed a thorough Head to Toe Skin Assessment on the patient. ALL assessment sites listed below have been assessed.      Areas assessed by both nurses:    Head, Face, Ears, Shoulders, Back, Chest, Arms, Elbows, Hands, Sacrum. Buttock, Coccyx, Ischium, Legs. Feet and Heels, and Under Medical Devices         Does the Patient have a Wound? Yes wound(s) were present on assessment. LDA wound assessment was Initiated and completed by RN       Collin Prevention initiated by RN: Yes  Wound Care Orders initiated by RN: Yes    For hospital-acquired stage 1 & 2 and ALL Stage 3,4, Unstageable, DTI, NWPT, and Complex wounds: place order “IP Wound Care/Ostomy Nurse Eval and Treat” by RN under : No    New Ostomies, if present place, Ostomy referral order under : No     Nurse 1 eSignature: Electronically signed by Angie Bhakta RN on 7/30/25 at 4:50 AM EDT    **SHARE this note so that the co-signing nurse can place an eSignature**    Nurse 2 eSignature: Electronically signed by Minerva Bailey RN on 7/30/25 at 5:14 AM EDT

## 2025-07-30 NOTE — PROGRESS NOTES
[facility] Progress Note        Brittney De La Fuente  1945  [id number]  7/30/2025    Summary:  CVA post TNK and mechanical thrombectomy     Assessment and plan:  Acute ischemic R MCA CVA with R MCA CVA:    -s/p TNK followed by EVT    -HCT on admission without bleeding    -cryptogenic may need ILR   -echo pending   -LDL 66, cont statin   -A1c  5.7  -PT OT SLP    --180  -MRI at 24 hours      Metabolic acidosis:    -hco3 19  -AG  15  -cl 105  -repeat BMP this afternoon     Demand ischemia:    -trop peaked at 202  -trend is down      Leukocytosis:    -favor reactive but with acidosis will add PCT  -trend   -no bands     Mild FMD    HPI:  remains critically ill post acute ischemic CVA.        ICU DAILY CHECKLIST     Code Status:full   DVT Prophylaxis:lovenox  T/L/D: PIVs  SUP: na  Diet: regular  Activity Level: Pt OT SLP   ABCDEF Bundle/Checklist Completed:Yes  Disposition: cont icu care  Multidisciplinary Rounds Completed: yes  Goals of Care Discussion/Palliative: yes  Patient/Family Updated: yes      OBJECTIVE  Vitals:    07/30/25 1400 07/30/25 1500 07/30/25 1600 07/30/25 1700   BP: 111/75 124/66 108/68 101/63   Pulse: 80 83 70 72   Resp: 16 16 18 20   Temp:   98.1 °F (36.7 °C)    TempSrc:   Oral    SpO2: 99% 100% 98% 100%   Weight:         EXAM:   GEN: awake alert and oriented   HEENT  -Head: NC/AT;  -Eyes: PERRL, EOMI. No discharge or redness;  -Ears: External ears are normal. Normal TMs.  -Nose: Normal nares.  -Mouth and throat: MMM. Normal gums, mucosa, palate,. Good dentition.  NECK: Supple, with no masses. No JVD   CV: RRR, no m/r/g.  LUNGS: CTAB, no w/r/c.  ABD: Soft, NT/ND, no masses, NTTP  SKIN: Warm, well perfused. No skin rashes or abnormal lesions.  EXT: No clubbing, cyanosis, or edema.  NEURO: Normal muscle strength and tone. No focal deficits.cranial nerves intact      CCM time:   45 mins.  This patient is critically ill with risk of clinical deterioration.  The documented time

## 2025-07-30 NOTE — PROGRESS NOTES
Pt arrives via ED for emergent mechanical thrombectomy. Pt arrives into dept with ED RN and NIS NP. This RN unable to complete full neuro exam due to acuity of condition/emergent procedure. Please defer to NIS NP note for pre-intervention neuro assessment and NIH. Anesthesia at bedside upon arrival. Consent obtained via .      Date/Time Last Known Well Time: 07/29/25 1915    Date/Time Discovery of Stroke Symptoms: 7/29/25 1930    NIHSS upon arrival: see NIS note    Time to IR Suite: 2121    Time of Arterial Puncture: 2126    Start of IA Infusion of tPA or  1st pass of recanalization device in   Target vessel: 2132    Time of Revascularization: 2133    Pretreatment TICI: 0    Post treatment TICI: 2c    Procedure Stop Time(When sterile drape is off): 2140    Time of first set of VS, neuro cks, puncture site, and pulse checks: 2140    Time transfer to ICU: 2205    Time of last VS, neuro, puncture site, and pulse checks documentation before ICU caring for pt: 2215    Time of first ICU assessment: 2230    EVD status: N/A            TRANSFER - OUT REPORT:    Verbal report given to Angie RN on Brittney De La Fuente  being transferred to ICU 18 for routine progression of patient care       Report consisted of patient's Situation, Background, Assessment and   Recommendations(SBAR).     Information from the following report(s) Nurse Handoff Report, ED SBAR, Adult Overview, and Neuro Assessment was reviewed with the receiving nurse.           Lines:   Peripheral IV 07/29/25 Left;Proximal;Anterior Forearm (Active)        Opportunity for questions and clarification was provided.      Patient transported with:  Monitor, O2 @ [facility], and Registered Nurse      Dual neuro completed with IR RN and receiving ICU RN upon arrival to unit.

## 2025-07-30 NOTE — PROGRESS NOTES
4 Eyes Skin Assessment     NAME:  Brittney De La Fuente  YOB: 1945  MEDICAL RECORD NUMBER:  [id number]    The patient is being assessed for  Admission    I agree that at least one RN has performed a thorough Head to Toe Skin Assessment on the patient. ALL assessment sites listed below have been assessed.      Areas assessed by both nurses:    Head, Face, Ears, Shoulders, Back, Chest, Arms, Elbows, Hands, Sacrum. Buttock, Coccyx, Ischium, Legs. Feet and Heels, and Under Medical Devices         Does the Patient have a Wound? Yes wound(s) were present on assessment. LDA wound assessment was Initiated and completed by RN       Collin Prevention initiated by RN: Yes  Wound Care Orders initiated by RN: Yes    For hospital-acquired stage 1 & 2 and ALL Stage 3,4, Unstageable, DTI, NWPT, and Complex wounds: place order “IP Wound Care/Ostomy Nurse Eval and Treat” by RN under : No    New Ostomies, if present place, Ostomy referral order under : No     Nurse 1 eSignature: Electronically signed by Angie Bhakta RN on 7/30/25 at 4:50 AM EDT    **SHARE this note so that the co-signing nurse can place an eSignature**    Nurse 2 eSignature: {Esignature:[id number]}

## 2025-07-30 NOTE — H&P
CRITICAL CARE ADMISSION NOTE    Name: Brittney De La Fuente   : 1945   MRN: [id number]   Date: 2025      Reason for ICU Admission: CVA post thrombolytics and mechanical thrombectomy     ICU PROBLEM LIST   CVA, R MCA s/p thrombolytics and mechanical thrombectomy   Acute hypoxemic respiratory failure    HISTORY OF PRESENT ILLNESS:   Pt is a 80yo F w/o significant PMH who presented to [facility] ED via EMS after fall while working in garden w/ L sided weakness, facial droop and dysarthria. LKW . CTH w/o acute bleed and pt given tNK . Further neuroimaging showing R M1 LVO, and pt taken for mechanical thrombectomy by Neuro IR. Pt admitted to ICU post op for further care.     NEUROLOGICAL:    Q1h neuro checks  Post Lytics and mechanical thrombectomy  NIS and neurology consult  Initial NIHSS 13, 4 post intervention  Neurovascular checks to access site  Repeat CTH at 24hr post tNK or if acute change in mental status  MRI B  ASA at 24hrs, statin  PT/OT   SLP    PULMONOLOGY:   O2 per NC for spO2 90-98%  CXR to further evaluate hypoxemia  PRN nebs  ?neurogenic edema    CARDIOVASCULAR:   MAP goal >65, SBP <160  ECHO ordered  Tele monitoring    GASTROINTESTINAL:   ADAT      RENAL/ELECTROLYTE/FLUIDS:   Strict I/Os  Monitor renal labs  Mg/Phos/K >2/3/4    ENDOCRINE:   Goal euglycemia    HEMATOLOGY/ONCOLOGY:   SCDs  Lovenox ppx at 24hrs     ID/MICRO:   No acute issues      ICU DAILY CHECKLIST     Code Status:full  DVT Prophylaxis:SCDs  T/L/D: PIV  SUP: NA  Diet: ADAT  Activity Level:ad leisa  ABCDEF Bundle/Checklist Completed:Yes  Disposition: Stay in ICU  Multidisciplinary Rounds Completed:  Pending  Patient/Family Updated: Yes      Review of Systems:   Negative except as noted above    Past Medical History:      has no past medical history on file.    Past Surgical History:      has a past surgical history that includes IR MECHANICAL ART THROMBECTOMY INTRACRANIAL (2025).    Home Medications:     Prior to Admission

## 2025-07-30 NOTE — ED TRIAGE NOTES
Pt arrives via EMS with a CC of extremity weakness. Pt fell on R side after working out in yard. Pt has R gaze, facial droop, aphasic, and dysarthric. LKW 1915. Blood sugar 101 for EMS. Not on blood thinners. CODE STROKE LEVEL 1 VAN +

## 2025-07-30 NOTE — ANESTHESIA PRE PROCEDURE
Department of Anesthesiology  Preprocedure Note       Name:  Brittney De La Fuente   Age:  79 y.o.  :  1945                                          MRN:  [id number]         Date:  2025      Surgeon: Surgeon(s):  Clay Da Silva DO    Procedure: * No procedures listed *    Medications prior to admission:   Prior to Admission medications    Not on File       Current medications:    No current facility-administered medications for this encounter.     No current outpatient medications on file.     Facility-Administered Medications Ordered in Other Encounters   Medication Dose Route Frequency Provider Last Rate Last Admin    sodium chloride 0.9 % bolus 1,000 mL  1,000 mL IntraVENous Once Tena Severino MD        sodium chloride flush 0.9 % injection 5-40 mL  5-40 mL IntraVENous 2 times per day Tena Severino MD        sodium chloride flush 0.9 % injection 5-40 mL  5-40 mL IntraVENous PRN Tena Severino MD        0.9 % sodium chloride infusion   IntraVENous PRN Tena Severino MD        dextrose bolus 10% 125 mL  125 mL IntraVENous PRN Tena Severino MD           Allergies:  No Known Allergies    Problem List:    Patient Active Problem List   Diagnosis Code    Senile purpura D69.2    CVA (cerebrovascular accident due to intracerebral hemorrhage) ([facility]) I61.9       Past Medical History:  History reviewed. No pertinent past medical history.    Past Surgical History:  History reviewed. No pertinent surgical history.    Social History:    Social History     Tobacco Use    Smoking status: Never    Smokeless tobacco: Never   Substance Use Topics    Alcohol use: Yes                                Counseling given: Not Answered      Vital Signs (Current): There were no vitals filed for this visit.                                           BP Readings from Last 3 Encounters:   25 (!) 109/57   02/10/25 (!) 110/56   24 116/70       NPO Status:

## 2025-07-30 NOTE — BRIEF OP NOTE
Brief Procedure Note      Patient: Brittney De La Fuente MRN: [id number]     YOB: 1945  Age: 79 y.o.  Sex: female      Service: Neurointerventional Surgery    Date of Procedure: 7/29/2025    Pre-Procedure Diagnosis: R MCA stroke    Post-Procedure Diagnosis: SAME    : Clay Da Silva DO    Assistant(s): N/A    Procedure(s):   Diagnostic cerebral angiogram  Mechanical thrombectomy    Vessels Studied:   Right CCA  Right ICA      Puncture Site: Right CFA--> 6fr angioseal    Preliminary Findings:   Mid r m1 occlusion, sup cervical ICA FMD  EVT--> TICI 2C recan    Plan:   - SBP<160  - MRI  - TTE      Specimens: None    Implants: None    Drains: None    Anesthesia: MAC    Estimated Blood Loss: 5 cc    Apparent Intraoperative Complications: None immediate    Patient Condition: Stable    Disposition: ICU      Signed:   Clay Da Silva DO  [facility] Neurointerventional Surgery  [facility]

## 2025-07-30 NOTE — CARE COORDINATION
Care Management Initial Assessment       RUR: 10%  Readmission? No  1st IM letter given? NA  1st  letter given: NA     07/30/25 0920   Service Assessment   Patient Orientation Alert and Oriented;Person;Place;Situation;Self   Cognition Alert   History Provided By Patient   Primary Caregiver Self   Support Systems Spouse/Significant Other  (Brennan De La Fuente [phone])   Patient's Healthcare Decision Maker is: Legal Next of Kin  (Spouse)   PCP Verified by CM Yes  (Dr Federico Lin)   Last Visit to PCP Within last 6 months   Prior Functional Level Independent in ADLs/IADLs   Current Functional Level Assistance with the following:;Mobility   Can patient return to prior living arrangement Unknown at present   Ability to make needs known: Good   Family able to assist with home care needs: Yes   Would you like for me to discuss the discharge plan with any other family members/significant others, and if so, who? Yes  (Spouse)   Financial Resources Other (Comment)  (BCBS)   Community Resources None   Social/Functional History   Lives With Spouse   Type of Home House   Home Layout Multi-level;Bed/Bath upstairs;Laundry in basement   Home Access Stairs to enter without rails   Entrance Stairs - Number of Steps 3   Bathroom Shower/Tub Walk-in shower   Bathroom Toilet Standard   Bathroom Equipment None   Bathroom Accessibility Not accessible   Home Equipment None   Receives Help From Family   Prior Level of Assist for ADLs Independent   Prior Level of Assist for Homemaking Independent   Homemaking Responsibilities Yes   Ambulation Assistance Independent   Prior Level of Assist for Transfers Independent   Active  Yes   Mode of Transportation Car   Occupation Retired   Discharge Planning   Living Arrangements Spouse/Significant Other   Current Services Prior To Admission None   Potential Assistance Needed N/A   Potential Assistance Purchasing Medications No   History of falls? 0     Patient presented to the ED with left

## 2025-07-30 NOTE — PROGRESS NOTES
Code Stroke Documentation      Symptoms: L sided weakness, R gaze, dysarthria, aphasia   Baseline mRS:   1   Last Known Well:    Medical hx: No past medical history on file.   Vitals: There were no vitals filed for this visit.   AC/APT:  none   VAN: Positive   NIHSS: 1a-LOC:0  1b-Month/Age:0  1c-Open/Close Hand:0  2-Best Gaze:1  3-Visual Fields:0  4-Facial Palsy:2  5a-Left Arm:3  5b-Right Arm:0  6a-Left Leg:3  6b-Right Le  7-Limb Ataxia:1  8-Sensory:1  9-Best Language:1  10-Dysarthria:1  11-Extinction/Inattention:1  TOTAL SCORE:13   Imaging (personally reviewed): CT: Neg for acute processes  CTA:Right MCA distal M1 occlusion.   CTP:    Plan: tNK Candidate: YES- administered at   Mechanical thrombectomy Candidate: YES to IR     *Perform dysphagia screening prior to any PO intake*     Discussed with: Dr Maycol ESCALERA, Dr Avinash JOHNSON, Dr Hanks Tele-Neuro    Arrival time: Met in ED    I have spent 50 mins of critical care time involved in lab review, consultations with specialist, family decision making and documentation. During this entire length of time I was immediately available to the patient.    LILO Cannon - CNP  Neurovascular Nurse Practitioner  [phone]

## 2025-07-30 NOTE — ED NOTES
Pt in rm, Assuming pt care, pt bib ems for fall today, facial droop, left side weakness since 7pm today.  Stroke alert lvl 1, neuro NP at bedside, tnk given

## 2025-07-30 NOTE — ED PROVIDER NOTES
[facility] EMERGENCY DEPARTMENT  EMERGENCY DEPARTMENT ENCOUNTER      Pt Name: Brittney De La Fuente  MRN: [id number]  Birthdate 1945  Date of evaluation: 7/29/2025  Provider: Tena Severino MD    CHIEF COMPLAINT       Chief Complaint   Patient presents with    Extremity Weakness         HISTORY OF PRESENT ILLNESS    Brittney De La Fuente is a 78 yo F with no significant medical history, not on anticoagulation, was working in her garden this afternoon and fell over and was not able to get up.   had seen her normal at 7:15 PM and EMS was called at 7:30.  When they arrive they noted left side weakness, expressive aphasia and dysarthria.  Blood sugar 101, BP 92/56.            Additional history from independent historians:     Review of External Medical Records:     Nursing Notes were reviewed.    REVIEW OF SYSTEMS       Review of Systems    Except as noted above the remainder of the review of systems was reviewed and negative.       PAST MEDICAL HISTORY   No past medical history on file.      SURGICAL HISTORY     No past surgical history on file.      CURRENT MEDICATIONS       Previous Medications    No medications on file       ALLERGIES     Patient has no known allergies.    FAMILY HISTORY     No family history on file.       SOCIAL HISTORY       Social History     Socioeconomic History    Marital status:    Tobacco Use    Smoking status: Never    Smokeless tobacco: Never   Substance and Sexual Activity    Alcohol use: Yes     Social Drivers of Health     Physical Activity: Sufficiently Active (11/9/2024)    Exercise Vital Sign     Days of Exercise per Week: 5 days     Minutes of Exercise per Session: 30 min         PHYSICAL EXAM       ED Triage Vitals [07/29/25 2022]   BP Systolic BP Percentile Diastolic BP Percentile Temp Temp src Pulse Respirations SpO2   126/65 -- -- -- -- 63 18 92 %      Height Weight - Scale         -- 47.2 kg (104 lb 0.9 oz)             Body mass index is 18.14 kg/m².    Physical

## 2025-07-31 VITALS
DIASTOLIC BLOOD PRESSURE: 59 MMHG | HEART RATE: 81 BPM | OXYGEN SATURATION: 98 % | TEMPERATURE: 97.4 F | SYSTOLIC BLOOD PRESSURE: 116 MMHG | WEIGHT: 106 LBS | RESPIRATION RATE: 20 BRPM | BODY MASS INDEX: 18.78 KG/M2

## 2025-07-31 LAB — ECHO BSA: 1.45 M2

## 2025-07-31 PROCEDURE — 0JH632Z INSERTION OF MONITORING DEVICE INTO CHEST SUBCUTANEOUS TISSUE AND FASCIA, PERCUTANEOUS APPROACH: ICD-10-PCS | Performed by: HOSPITALIST

## 2025-07-31 PROCEDURE — 92526 ORAL FUNCTION THERAPY: CPT

## 2025-07-31 PROCEDURE — 6360000002 HC RX W HCPCS: Performed by: HOSPITALIST

## 2025-07-31 PROCEDURE — 97110 THERAPEUTIC EXERCISES: CPT

## 2025-07-31 PROCEDURE — 99231 SBSQ HOSP IP/OBS SF/LOW 25: CPT

## 2025-07-31 PROCEDURE — 97116 GAIT TRAINING THERAPY: CPT

## 2025-07-31 PROCEDURE — 6360000002 HC RX W HCPCS: Performed by: STUDENT IN AN ORGANIZED HEALTH CARE EDUCATION/TRAINING PROGRAM

## 2025-07-31 PROCEDURE — 2500000003 HC RX 250 WO HCPCS: Performed by: STUDENT IN AN ORGANIZED HEALTH CARE EDUCATION/TRAINING PROGRAM

## 2025-07-31 PROCEDURE — 33285 INSJ SUBQ CAR RHYTHM MNTR: CPT | Performed by: HOSPITALIST

## 2025-07-31 PROCEDURE — C1764 EVENT RECORDER, CARDIAC: HCPCS | Performed by: HOSPITALIST

## 2025-07-31 PROCEDURE — 2709999900 HC NON-CHARGEABLE SUPPLY: Performed by: HOSPITALIST

## 2025-07-31 PROCEDURE — 6370000000 HC RX 637 (ALT 250 FOR IP): Performed by: STUDENT IN AN ORGANIZED HEALTH CARE EDUCATION/TRAINING PROGRAM

## 2025-07-31 PROCEDURE — 97112 NEUROMUSCULAR REEDUCATION: CPT

## 2025-07-31 DEVICE — ICM LNQ22 LINQ II PRIME US
Type: IMPLANTABLE DEVICE | Status: FUNCTIONAL
Brand: LINQ II™

## 2025-07-31 RX ORDER — ASPIRIN 81 MG/1
81 TABLET, CHEWABLE ORAL DAILY
Qty: 30 TABLET | Refills: 3 | Status: SHIPPED | OUTPATIENT
Start: 2025-08-01

## 2025-07-31 RX ORDER — ATORVASTATIN CALCIUM 10 MG/1
10 TABLET, FILM COATED ORAL NIGHTLY
Qty: 30 TABLET | Refills: 2 | Status: SHIPPED | OUTPATIENT
Start: 2025-07-31

## 2025-07-31 RX ADMIN — SODIUM CHLORIDE, PRESERVATIVE FREE 10 ML: 5 INJECTION INTRAVENOUS at 10:46

## 2025-07-31 RX ADMIN — ASPIRIN 81 MG: 81 TABLET, CHEWABLE ORAL at 10:46

## 2025-07-31 RX ADMIN — ENOXAPARIN SODIUM 30 MG: 100 INJECTION SUBCUTANEOUS at 10:46

## 2025-07-31 NOTE — PROGRESS NOTES
Neurology Progress Note     NAME: Brittney De La Fuente   :  1945   MRN:  [id number]   DATE:  2025    Assessment:   78 yo female without significant medical history presented to [facility] ED on  with left sided weakness, rightward gaze and aphasia.  last saw her well immediately prior when she was working in the yard. Code Stroke called. /65. NIHSS 13. CTH negative. CTA H/N with right MCA M1 occlusion. TNK administered at . Pt to angio at  for emergent mechanical thrombectomy with Dr Da Silva. CATHIEI 2C recan.     MRI brain scattered small predominantly right MCA territory and/or punctate foci of acute  infarction without associated hemorrhage.   ECHO with EF 50-55%. Hypokinesis noted in anterior wall  A1c 5.7, LDL 66  Plan:   - Q4h neuro checks  - SBP goal normotension  - ASA 81mg daily  - LDL at goal. Likely low intensity statin will be enough at DC  - ILR at d/c     Objective:   Chart reviewed since last seen    Current Facility-Administered Medications   Medication Dose Route Frequency    acetaminophen (TYLENOL) tablet 650 mg  650 mg Oral Q4H PRN    0.9 % sodium chloride infusion   IntraVENous PRN    dextrose bolus 10% 125 mL  125 mL IntraVENous PRN    sodium chloride flush 0.9 % injection 5-40 mL  5-40 mL IntraVENous 2 times per day    sodium chloride flush 0.9 % injection 5-40 mL  5-40 mL IntraVENous PRN    0.9 % sodium chloride infusion   IntraVENous PRN    ondansetron (ZOFRAN-ODT) disintegrating tablet 4 mg  4 mg Oral Q8H PRN    Or    ondansetron (ZOFRAN) injection 4 mg  4 mg IntraVENous Q6H PRN    polyethylene glycol (GLYCOLAX) packet 17 g  17 g Oral Daily PRN    enoxaparin Sodium (LOVENOX) injection 30 mg  30 mg SubCUTAneous Daily    aspirin chewable tablet 81 mg  81 mg Oral Daily    Or    aspirin suppository 300 mg  300 mg Rectal Daily    niCARdipine (CARDENE) 25 mg in sodium chloride 0.9 % 250 mL infusion ([id number])  0.5-15 mg/hr IntraVENous Continuous    atorvastatin (LIPITOR)

## 2025-07-31 NOTE — DISCHARGE INSTRUCTIONS
Discharge Instructions       PATIENT ID: Brittney De La Fuente  MRN: [id number]   YOB: 1945    DATE OF ADMISSION: 7/29/2025   DATE OF DISCHARGE: 7/31/2025    PRIMARY CARE PROVIDER: Federico Lin     ATTENDING PHYSICIAN: Grant Shine MD   DISCHARGING PROVIDER: Grant Shine MD    To contact this individual call [phone] and ask the  to page.   If unavailable ask to be transferred the Adult Hospitalist Department.    DISCHARGE DIAGNOSES Acute CVA    CONSULTATIONS: [unfilled]    PROCEDURES/SURGERIES: Procedure(s):  Loop recorder insert    PENDING TEST RESULTS:   At the time of discharge the following test results are still pending: NONE    FOLLOW UP APPOINTMENTS:   [facility]     ADDITIONAL CARE RECOMMENDATIONS:  Start Aspirin 81mg and Lipitor 10mg nightly- sent to your pharmacy    DIET: regular diet      ACTIVITY: activity as tolerated and  NO DRIVING FOR 6 MONTHS       DISCHARGE MEDICATIONS:   See Medication Reconciliation Form    It is important that you take the medication exactly as they are prescribed.   Keep your medication in the bottles provided by the pharmacist and keep a list of the medication names, dosages, and times to be taken in your wallet.   Do not take other medications without consulting your doctor.       NOTIFY YOUR PHYSICIAN FOR ANY OF THE FOLLOWING:   Fever over 101 degrees for 24 hours.   Chest pain, shortness of breath, fever, chills, nausea, vomiting, diarrhea, change in mentation, falling, weakness, bleeding. Severe pain or pain not relieved by medications.  Or, any other signs or symptoms that you may have questions about.      DISPOSITION:   X Home With: Outpatient PT/OT   OT  PT  HH  RN       SNF/Inpatient Rehab/LTAC    Independent/assisted living    Hospice    Other:           Signed:   Grant Shine MD  7/31/2025  2:37 PM

## 2025-07-31 NOTE — DISCHARGE SUMMARY
Discharge Summary       PATIENT ID: Brittney De La Fuente  MRN: [id number]   YOB: 1945    DATE OF ADMISSION: 7/29/2025  8:23 PM    DATE OF DISCHARGE: 07/31/25    PRIMARY CARE PROVIDER: Federico Lin MD     ATTENDING PHYSICIAN: Grant Shine MD    DISCHARGING PROVIDER: Grant Shine MD    To contact this individual call [phone] and ask the  to page.  If unavailable ask to be transferred the Adult Hospitalist Department.    CONSULTATIONS: IP CONSULT TO TELE-NEUROLOGY  IP CONSULT TO INTENSIVIST  IP CONSULT TO NEUROINTERVENTIONAL SURGERY  IP CONSULT TO NEUROLOGY  IP CONSULT TO CASE MANAGEMENT  IP CONSULT TO NEUROINTERVENTIONAL SURGERY  IP CONSULT TO ELECTROPHYSIOLOGY  IP CONSULT TO CASE MANAGEMENT    PROCEDURES/SURGERIES: Procedure(s):  Loop recorder insert    ADMITTING DIAGNOSES & HOSPITAL COURSE:       78 Yo female with no PMHx presented to hospital with sudden onset of left sided weakness, aphasia and facial droop. Patient had rapid ER eval . CT head negative for bleed. Patient given TNK and NeuroIR consulted. Patient underwent Rt M1 thrombectomy and observed in ICU requiring Cardene gtt. Patient's neuro symptoms improved and transferred out of ICU. ECHO was WNL, Neurology team recommended ILR which was placed on before discharge by S cardiology. Patient will follow up with them follow up. Patient was educated regarding NO driving for 6 months due to stroke. Patient will be on ASA and low dose Statin on discharge. Patient is stable for discharge home.    PENDING TEST RESULTS:   At the time of discharge the following test results are still pending: NONE    FOLLOW UP APPOINTMENTS:   [facility]     ADDITIONAL CARE RECOMMENDATIONS:  Start Aspirin 81mg and Lipitor 10mg nightly- sent to your pharmacy    DIET: regular diet      ACTIVITY: activity as tolerated and  NO DRIVING FOR 6 MONTHS       DISCHARGE MEDICATIONS:     Medication List        START taking these medications

## 2025-07-31 NOTE — CONSULTS
NEUROLOGY CONSULT NOTE    Name Brittney De La Fuente Age 79 y.o.   MRN [id number]  1945     Consulting Physician: Geronimo Cardoso MD      Chief Complaint:  Stroke      Assessment:   78 yo female without significant medical history presented to [facility] ED on  with left sided weakness, rightward gaze and aphasia.  last saw her well immediately prior when she was working in the yard. Code Stroke called. /65. NIHSS 13. CTH negative. CTA H/N with right MCA M1 occlusion. TNK administered at . Pt to angio at  for emergent mechanical thrombectomy with Dr Da Silva. TICI 2C recan. Admitted to ICU post procedure. On arrival, pt with hypoxia requiring O2 supplementation. Troponins elevated.   Lives in [place], [place] with  Brennan De La Fuente   No tob/occasional ETOH/ no ilicits     Recommendations:   - Q1h neuro checks  - SBP goal < 160  - No ASA/anticoagulants until 24 hours post TNK and imaging is negative for hemorrhage  - A1C and lipid panel pending, LDL goal <70  - MRI brain ordered  - ECHO ordered  - PT/OT/SLP evals  - Trend troponin    Please time MRI brain for 24 hours post TNK. If MRI cannot be completed around TNK completion time, then pt will need CT head to rule out hemorrhage post TNK. If MRI is obtained, pt does not also need CT head.    History of Present Illness:      This is a 79 y.o. female without past medical history who was found by  s/p fall with acute L sided weakness, aphasia and dysarthria. LKW just prior to fall. NIHSS 13 on arrival. CT neuroimaging significant for right MCA M1 occlusion. tNK administered at . Pt to angio for mechanical thrombectomy at  TICI 2c recan. To ICU immediately post-procedure for further workup and monitoring.     No Known Allergies     Prior to Admission medications    Not on File       No past medical history on file.     Past Surgical History:   Procedure Laterality Date    IR MECHANICAL ART THROMBECTOMY INTRACRANIAL  
    [facility] CARDIOLOGY  Cardiac Electrophysiology Note    ATTESTATION    I have seen, interviewed, and examined the patient.  I agree with the history, exam, and was involved in the formulation of the assessment and plan as detailed in the Cardiology/Cardiac Electrophysiology consultation documentation composed today by the Advance Practice Provider (DAVID, NP, PA). Please see the DAVID’s note for full details, below includes any additional revisions. I have performed the exam and medical decision making portions in its entirety. I personally reviewed the relevant data including labs and imaging.      Assessment/Plan/Medical Decision Making EP Attending:     # Acute CVA  # Dilated left atrium  # Sinus rhythm with PACs  # Thin and frail female    79-year-old female who presented with left-sided weakness.  MRI showed scattered small predominantly right MCA territory and/or punctate foci of acute infarction without associated hemorrhage.   Underwent mechanical thrombectomy.  Her LVEF is preserved on echocardiogram.  Given her dilated left atrium, frequent PACs on telemetry and cryptogenic stroke with scattered infarcts, suspect embolic etiology.  She would benefit from monitoring for atrial fibrillation.  Recommend ILR.  Will plan to implant today.    I discussed with patient the risks, benefits, and alternatives with respect to loop recorder implantation. The risk for complications is <1%. Risks discussed include but were not limited to bleeding, infection, heart attack, stroke, or death. Patient expressed understanding and agreed to proceed.      Lam Gomez MD, RS  [facility]  [facility]           Social History     Tobacco Use    Smoking status: Never    Smokeless tobacco: Never   Substance Use Topics    Alcohol use: Yes           Cardiac testing  I personally reviewed patient's EKGs and telemetry.  ECG: Sinus rhythm with PACs, narrow QRS  Telemetry: see 
See neurointerventional consult 7/29/25 2336pm    Chel Camargo, APRN - CNP    
performed CT findings were described in a separate report.    Findings were called to Dr. Severino at approximately 2051 hours on 7/29/2025.        Electronically signed by Myesha Mondragon      Assessment:   R MCA stroke S/P tNK  Right distal M1 occlusion s/p mechanical thrombectomy with Dr Da Silva TICREAL 2C recan      Plan:   - Neuro checks per post-tNK protocol. Then Q1h  - SBP goal < 160  - No ASA/anticoagulants until 24 hours post TNK and imaging is negative for hemorrhage  - A1C and lipid panel pending, LDL goal <70  - MRI brain ordered  - ECHO ordered  - PT/OT/SLP evals  - Stroke education  - Neurology consult    Please time MRI brain for 24 hours post TNK. If MRI cannot be completed around TNK completion time, then pt will need CT head to rule out hemorrhage post TNK. If MRI is obtained, pt does not also need CT head.         I have discussed the diagnosis and the intended plan as seen in the above orders with Dr. Da Silva, the patient and the primary RN. Patient/family updated on current plan of care and is in agreement. All questions were answered.    Thank you for this consult and participating in the care of this patient.  Signed By: LILO Cannon CNP     July 29, 2025         Chel Mary Jo, APRN - CNP have spent 60 mins of critical care time involved in lab review, consultations with specialist, family decision making and documentation. During this entire length of time I was immediately available to the patient.

## 2025-07-31 NOTE — H&P
History and Physical    Date of Service:  7/31/2025  Primary Care Provider: Federico Lin MD  Source of information: electronic medical record    Chief Complaint: Extremity Weakness      History of Presenting Illness:   Brittney De La Fuente is a previously healthy 79 y.o. female who was admitted to the ICU s/p R M1 thrombectomy.  She presented after a fall in her garden with left sided weakness, facial droop, and dysarthria.  She was admitted to the ICU s/p thrombectomy, with stable neuro exam, and now being transferred to [facility].        REVIEW OF SYSTEMS:  Review of systems not obtained due to patient factors.     No past medical history on file.   Past Surgical History:   Procedure Laterality Date    IR MECHANICAL ART THROMBECTOMY INTRACRANIAL  7/29/2025    IR MECHANICAL ART THROMBECTOMY INTRACRANIAL 7/29/2025 [facility] RAD ANGIO IR     Prior to Admission medications    Not on File     No Known Allergies   No family history on file.   Social History:  reports that she has never smoked. She has never used smokeless tobacco. She reports current alcohol use.   Social Drivers of Health     Tobacco Use: Low Risk  (7/29/2025)    Patient History     Smoking Tobacco Use: Never     Smokeless Tobacco Use: Never     Passive Exposure: Not on file   Alcohol Use: Not At Risk (11/9/2024)    AUDIT-C     Frequency of Alcohol Consumption: 2-3 times a week     Average Number of Drinks: 1 or 2     Frequency of Binge Drinking: Never   Financial Resource Strain: Not on file   Food Insecurity: Not on file   Transportation Needs: Not on file   Physical Activity: Sufficiently Active (11/9/2024)    Exercise Vital Sign     Days of Exercise per Week: 5 days     Minutes of Exercise per Session: 30 min   Stress: Not on file   Social Connections: Not on file   Intimate Partner Violence: Not on file   Depression: Not at risk (2/10/2025)    PHQ-2     PHQ-2 Score: 0   Housing Stability: Not on file   Interpersonal Safety: Not on file   Utilities:

## 2025-07-31 NOTE — PLAN OF CARE
Problem: Occupational Therapy - Adult  Goal: By Discharge: Performs self-care activities at highest level of function for planned discharge setting.  See evaluation for individualized goals.  Description: FUNCTIONAL STATUS PRIOR TO ADMISSION:  Pt was (I) with ADLs, IADLs, and functional mobility without AD.    HOME SUPPORT: Pt lives with her  and did not require assist.    Occupational Therapy Goals  Initiated 7/30/2025   1.  Patient will perform grooming with Stand by Assist within 7 day(s).  2.  Patient will perform upper body dressing with Stand by Assist within 7 day(s).  3.  Patient will perform lower body dressing with Stand by Assist within 7 day(s).  4.  Patient will perform toilet transfers with Stand by Assist within 7 day(s).  5.  Patient will perform all aspects of toileting with Stand by Assist within 7 day(s).  6.  Patient will participate in upper extremity therapeutic exercise/activities with Supervision within 7 day(s).    7.  Patient will utilize energy conservation techniques during functional activities with verbal cues within 7 day(s).  8.  Patient will improve their Fugl Kelly score by 5 points in prep for ADLs within 7 days.    7/31/2025 1426 by Deborah Linares OT  Outcome: Progressing     Problem: Safety - Adult  Goal: Free from fall injury  Outcome: Adequate for Discharge     Problem: Discharge Planning  Goal: Discharge to home or other facility with appropriate resources  Outcome: Adequate for Discharge  Flowsheets (Taken 7/31/2025 0800)  Discharge to home or other facility with appropriate resources:   Identify barriers to discharge with patient and caregiver   Identify discharge learning needs (meds, wound care, etc)   Arrange for interpreters to assist at discharge as needed   Refer to discharge planning if patient needs post-hospital services based on physician order or complex needs related to functional status, cognitive ability or social support system   Arrange for needed 
  Problem: Occupational Therapy - Adult  Goal: By Discharge: Performs self-care activities at highest level of function for planned discharge setting.  See evaluation for individualized goals.  Description: FUNCTIONAL STATUS PRIOR TO ADMISSION:  Pt was (I) with ADLs, IADLs, and functional mobility without AD.    HOME SUPPORT: Pt lives with her  and did not require assist.    Occupational Therapy Goals  Initiated 7/30/2025   1.  Patient will perform grooming with Stand by Assist within 7 day(s).  2.  Patient will perform upper body dressing with Stand by Assist within 7 day(s).  3.  Patient will perform lower body dressing with Stand by Assist within 7 day(s).  4.  Patient will perform toilet transfers with Stand by Assist within 7 day(s).  5.  Patient will perform all aspects of toileting with Stand by Assist within 7 day(s).  6.  Patient will participate in upper extremity therapeutic exercise/activities with Supervision within 7 day(s).    7.  Patient will utilize energy conservation techniques during functional activities with verbal cues within 7 day(s).  8.  Patient will improve their Fugl Kelly score by 5 points in prep for ADLs within 7 days.    Outcome: Progressing   OCCUPATIONAL THERAPY TREATMENT  Patient: Brittney De La Fuente (79 y.o. female)  Date: 7/31/2025  Primary Diagnosis: CVA (cerebrovascular accident due to intracerebral hemorrhage) (HCC) [I61.9]  Cerebrovascular accident (CVA), unspecified mechanism (HCC) [I63.9]  Procedure(s) (LRB):  Loop recorder insert (N/A) * Day of Surgery *   Precautions: Fall Risk                Chart, occupational therapy assessment, plan of care, and goals were reviewed.    ASSESSMENT  Pt rec'd sitting EOB, agreeable to OT tx. Patient continues to benefit from skilled OT services and is progressing towards goals. Pt issued green resistive sponge, six pack hand and resistive sponge HEPs for LUE. Exercises reviewed with pt demo'ing good understanding. LUE coordination noted 
  Problem: Occupational Therapy - Adult  Goal: By Discharge: Performs self-care activities at highest level of function for planned discharge setting.  See evaluation for individualized goals.  Description: FUNCTIONAL STATUS PRIOR TO ADMISSION:  Pt was (I) with ADLs, IADLs, and functional mobility without AD.    HOME SUPPORT: Pt lives with her  and did not require assist.    Occupational Therapy Goals  Initiated 7/30/2025   1.  Patient will perform grooming with Stand by Assist within 7 day(s).  2.  Patient will perform upper body dressing with Stand by Assist within 7 day(s).  3.  Patient will perform lower body dressing with Stand by Assist within 7 day(s).  4.  Patient will perform toilet transfers with Stand by Assist within 7 day(s).  5.  Patient will perform all aspects of toileting with Stand by Assist within 7 day(s).  6.  Patient will participate in upper extremity therapeutic exercise/activities with Supervision within 7 day(s).    7.  Patient will utilize energy conservation techniques during functional activities with verbal cues within 7 day(s).  8.  Patient will improve their Fugl Kelly score by 5 points in prep for ADLs within 7 days.    Outcome: Progressing  OCCUPATIONAL THERAPY EVALUATION    Patient: Brittney De La Fuente (79 y.o. female)  Date: 7/30/2025  Primary Diagnosis: CVA (cerebrovascular accident due to intracerebral hemorrhage) ([facility]) [I61.9]  Cerebrovascular accident (CVA), unspecified mechanism (HCC) [I63.9]         Precautions: Fall Risk                  ASSESSMENT :  Pt rec'd sitting up in bed, agreeable to OT eval. Pt admitted with R MCA stroke S/P tNK and R distal M1 occlusion s/p mechanical thrombectomy with Dr Da Silva. She is limited by decreased LUE ROM/strength/coordination, activity tolerance, safety awareness, L inattention, and balance impacting safety and (I) with ADLs and functional mobility. Pt currently requires min A overall for functional mobility and ADLs. She is functioning 
  Problem: Physical Therapy - Adult  Goal: By Discharge: Performs mobility at highest level of function for planned discharge setting.  See evaluation for individualized goals.  Description: FUNCTIONAL STATUS PRIOR TO ADMISSION: Patient was independent and active without use of DME.    HOME SUPPORT PRIOR TO ADMISSION: The patient lived with her spouse but did not require assistance.    Physical Therapy Goals  Initiated 7/30/2025  1.  Patient will move from supine to sit and sit to supine and scoot up and down in bed with modified independence within 7 day(s).    2.  Patient will perform sit to stand with supervision/set-up within 7 day(s).  3.  Patient will transfer from bed to chair and chair to bed with supervision/set-up using the least restrictive device within 7 day(s).  4.  Patient will ambulate with supervision/set-up for 150 feet with the least restrictive device within 7 day(s).   5.  Patient will ascend/descend 12 stairs with handrail(s) with supervision/set-up within 7 day(s).  6.  Patient will improve Lewis Balance score by 7 points within 7 days.    Outcome: Progressing   PHYSICAL THERAPY EVALUATION    Patient: Brittney De La Fuente (79 y.o. female)  Date: 7/30/2025  Primary Diagnosis: CVA (cerebrovascular accident due to intracerebral hemorrhage) ([facility]) [I61.9]  Cerebrovascular accident (CVA), unspecified mechanism ([facility]) [I63.9]       Precautions: Restrictions/Precautions  Restrictions/Precautions: Fall Risk            ASSESSMENT :   DEFICITS/IMPAIRMENTS:   The patient presents with impaired functional mobility as compared to baseline level 2* mild L UE/LE weakness, impaired L coordination, mild L inattention, impaired balance, impaired gait, and dec insight to deficits following admission for weakness and aphasia. Found to have an acute R MCA LVO, now s/p TNK and emergent mechanical thrombectomy, POD 1. At baseline, she lives at home with her spouse and is fully indep and active without AD.     Today, she 
  Problem: Safety - Adult  Goal: Free from fall injury  Outcome: Progressing  Flowsheets (Taken 7/31/2025 0000)  Free From Fall Injury:   Instruct family/caregiver on patient safety   Based on caregiver fall risk screen, instruct family/caregiver to ask for assistance with transferring infant if caregiver noted to have fall risk factors     Problem: Chronic Conditions and Co-morbidities  Goal: Patient's chronic conditions and co-morbidity symptoms are monitored and maintained or improved  Outcome: Progressing  Flowsheets (Taken 7/31/2025 0000)  Care Plan - Patient's Chronic Conditions and Co-Morbidity Symptoms are Monitored and Maintained or Improved: Monitor and assess patient's chronic conditions and comorbid symptoms for stability, deterioration, or improvement     Problem: Discharge Planning  Goal: Discharge to home or other facility with appropriate resources  Outcome: Progressing  Flowsheets (Taken 7/31/2025 0000)  Discharge to home or other facility with appropriate resources: Identify barriers to discharge with patient and caregiver     Problem: Pain  Goal: Verbalizes/displays adequate comfort level or baseline comfort level  Outcome: Progressing     
Speech LAnguage Pathology EVALUATION    Patient: Brittney De La Fuente (79 y.o. female)  Date: 7/30/2025  Primary Diagnosis: CVA (cerebrovascular accident due to intracerebral hemorrhage) ([facility]) [I61.9]  Cerebrovascular accident (CVA), unspecified mechanism ([facility]) [I63.9]       Precautions:  Fall Risk                  ASSESSMENT :  Patient admitted with R MCA occlusion s/p TNK and mechanical thrombectomy with TICI 2C reperfusion. At bedside this date, patient without any neurological deficits during oral-motor examination, no dysarthria or motor speech deficits during speech tasks. Patient with immediate cough response to initial sip of thin liquid via straw. Patient sat up further than HOB was able to, requested to drink from cup without the straw, and displayed no further overt signs or symptoms of aspiration with thin liquids. No obvious oral deficits.     Educated patient on risk of silent aspiration given area of LVO impacting sensory and motor areas of the brain. Patient verbalized understanding. Patient presented with the option to continue on Regular diet and monitor for clinical signs of aspiration while in the hospital (developing infiltrate on chest imaging, increase in WBC), knowing she is at risk for aspiration and possible related pulmonary complications; or, to complete Flexible Endoscopic Evaluation of Swallow (FEES) at bedside to objectively assess pharyngeal swallow function and rule out aspiration. After verbalizing understanding of education re: risk of aspiration, patient elected to continue with Regular diet and monitor tolerance clinically. SLP will continue to follow for diet tolerance and further evaluation of cognitive-communication function as indicated.     Patient will benefit from skilled intervention to address the above impairments.     PLAN :  Recommendations and Planned Interventions:  Diet: Regular and thin liquids  - Low threshold for imaging if clinical signs of aspiration arise  - General 
Speech LAnguage Pathology TREATMENT/DISCHARGE    Patient: Brittney De La Fuente (79 y.o. female)  Date: 7/31/2025  Primary Diagnosis: CVA (cerebrovascular accident due to intracerebral hemorrhage) ([facility]) [I61.9]  Cerebrovascular accident (CVA), unspecified mechanism (HCC) [I63.9]  Procedure(s) (LRB):  Loop recorder insert (N/A) * Day of Surgery *   Precautions:  Fall Risk                  ASSESSMENT :  Patient continues to present without overt signs or symptoms of aspiration at bedside with any consistency. Remains at degree of risk for silent aspiration given R MCA occlusion. Note increase in WBC. Provided re-education to patient re: risk for silent aspiration and related pulmonary complications. Again offered Flexible Endoscopic Evaluation of Swallow (FEES) at bedside to rule-out silent aspiration, however patient, with informed knowledge of risks, would rather forego that procedure at this time. Given subjective tolerance of diet at bedside without desire for objective imaging from patient, SLP to sign off at this time.     PLAN :  Recommendations and Planned Interventions:  Diet: Regular and thin liquids       Acute SLP Services: No, patient will be discharged from acute skilled speech-language pathology at this time.  Discharge Recommendations: No, additional SLP treatment not indicated at discharge     SUBJECTIVE:   Patient stated, “I don't really think I'd have any issues.”    OBJECTIVE:   No past medical history on file.  Past Surgical History:   Procedure Laterality Date    IR MECHANICAL ART THROMBECTOMY INTRACRANIAL  7/29/2025    IR MECHANICAL ART THROMBECTOMY INTRACRANIAL 7/29/2025 [facility] RAD ANGIO IR     Prior Level of Function/Home Situation:   Social/Functional History  Lives With: Spouse  Type of Home: House  Home Layout: Two level, Bed/Bath upstairs, Laundry in basement  Home Access: Stairs to enter without rails  Entrance Stairs - Number of Steps: 3  Bathroom Shower/Tub: Walk-in shower  Bathroom Toilet: 
Measure for Post-Acute Care \"6-Clicks\" Basic Mobility Scores Predict Discharge Destination After Acute Care Hospitalization in [facility] Patient Groups: A Retrospective, Observational Study. Arch Rehabil Res Clin Transl. 2022 Jul 16;4(3):327165. doi: 10.1016/[email or web address].2022.307433. PMID: [id number]; PMCID: FTY2103636.  4. Lashonda RIOS, Silvia S, Edgar W, Meredith P. AM-PAC Short Forms Manual 4.0. Revised 2/2020.                                                                                                                                                                                                                              Pain Rating:  No report or s/s pain.    Pain Intervention(s):   Not applicable.    Activity Tolerance:   Good    After treatment:   Patient received sitting in the chair with chair alarm not activated and cleared for therapy by nursing. Pt reports that she has been walking to bathroom without assist.     Patient left in no apparent distress sitting up in chair, Call bell within reach, and meal tray and all needs in reach.    COMMUNICATION/EDUCATION:   The patient's plan of care was discussed with: occupational therapist and registered nurse    Patient Education  Education Given To: Patient  Education Provided: Role of Therapy;Transfer Training;Equipment;Mobility Training;Plan of Care;Energy Conservation;Fall Prevention Strategies;Precautions;Orientation  Education Method: Demonstration;Verbal;Teach Back  Barriers to Learning: None  Education Outcome: Verbalized understanding;Demonstrated understanding;Continued education needed      Kimberly Zhu, PT, DPT

## 2025-07-31 NOTE — CARE COORDINATION
STEVIE PLAN:    RUR-8%    Disposition-Home with family.    Patient to go to Outpatient Rehab. She prefers to go to the [facility]. CM gave patient prescription for therapy.     Transportation-by family    Patient did not voice any discharge barriers. TARIK Bates MSA, RN, CM

## 2025-07-31 NOTE — PROCEDURES
ELECTROPHYSIOLOGY PROCEDURE     PROCEDURE: CARDIAC MEMORY LOOP RECORDER IMLANTATION    OPERATION PERFORMED:   Implantation of Medtronic LINQ II  cardiac memory loop recorder at the anterior chest wall.    ATTENDING: Lam Gomez MD    ANESTHESIA: Local anesthetic    ESTIMATED BLOOD LOSS: minimal (less than 5 cc)    COMPLICATIONS: None.    TIME OUT: Time out was completed with verification of the correct patient identity, procedure to be performed, procedure site and implanted equipment.    INDICATION FOR PROCEDURE: Cryptogenic stroke    PROCEDURE AND FINDINGS:  Informed consent was obtained prior to the procedure and confirmed. After the site of implantation was prepped and draped in the usual sterile fashion and after adequate anesthesia was given, the skin was infiltrated with local anesthetic.      The skin was incised using the implant tool at the 4th intercostal space 2 cm lateral to the left edge of the sternum. The implant trochar was used to tunnel into the subcutaneous tissue and the ICM was advanced into place using the plunger tool. The implant tool was removed and pressure held.    Patient was very thin with minimal subcutaneous tissue.  ILR implanted more vertically given thin patient with likely vertical heart.    Excellent R-wave measurements were obtained. Pressure was held till there was minimal bleeding. The incision was closed with a single  4-0 Monocryl stratafix suture and was dressed with Dermabond then covered with a sterile dressing    CONCLUSION:  Successful implantation of cardiac memory loop recorder system.

## 2025-07-31 NOTE — PROGRESS NOTES
Stroke Education provided to patient and spouse/SO and the following topics were discussed    1. Patients personal risk factors for stroke are atrial fibrillation, family history, and hyperlipidemia    2. Warning signs of Stroke:        * Sudden numbness or weakness of the face, arm or leg, especially on one side of          The body            * Sudden confusion, trouble speaking or understanding        * Sudden trouble seeing in one or both eyes        * Sudden trouble walking, dizziness, loss of balance or coordination        * Sudden severe headache with no known cause      3. Importance of activation Emergency Medical Services ( 9-1-1 ) immediately if experience any warning signs of stroke.    4. Be sure and schedule a follow-up appointment with your primary care doctor or any specialists as instructed.     5. You must take medicine every day to treat your risk factors for stroke.  Be sure to take your medicines exactly as your doctor tells you: no more, no less.  Know what your medicines are for , what they do.  Anti-thrombotics /anticoagulants can help prevent strokes.  You are taking the following medicine(s)  SEE ATTACHED    6.  Smoking and second-hand smoke greatly increase your risk of stroke, cardiovascular disease and death. Smoking history never    7. Information provided was [facility] Stroke Education Binder, Stroke Handouts, or Verbal Education    8. Documentation of teaching completed in Patient Education Activity and on Care Plan with teaching response noted?  yes

## 2025-08-01 ENCOUNTER — TELEPHONE (OUTPATIENT)
Age: 80
End: 2025-08-01

## 2025-08-01 NOTE — TELEPHONE ENCOUNTER
Spoke to patient in regards of her hosp fu 1 year from now. Gave her date/time/location and patient verbalized back information given.

## 2025-08-04 ENCOUNTER — TELEPHONE (OUTPATIENT)
Age: 80
End: 2025-08-04

## 2025-08-04 DIAGNOSIS — I48.91 ATRIAL FIBRILLATION (HCC): Primary | ICD-10-CM

## 2025-08-04 PROBLEM — Z86.73 HISTORY OF STROKE: Status: ACTIVE | Noted: 2025-08-04

## 2025-08-04 RX ORDER — METOPROLOL TARTRATE 50 MG
50 TABLET ORAL 2 TIMES DAILY
Qty: 180 TABLET | Refills: 1 | Status: SHIPPED | OUTPATIENT
Start: 2025-08-04

## 2025-08-07 ENCOUNTER — TELEPHONE (OUTPATIENT)
Age: 80
End: 2025-08-07

## 2025-08-11 ENCOUNTER — TELEPHONE (OUTPATIENT)
Age: 80
End: 2025-08-11

## 2025-08-18 ENCOUNTER — APPOINTMENT (OUTPATIENT)
Facility: HOSPITAL | Age: 80
End: 2025-08-18
Payer: COMMERCIAL

## 2025-08-18 ENCOUNTER — HOSPITAL ENCOUNTER (EMERGENCY)
Facility: HOSPITAL | Age: 80
Discharge: HOME | End: 2025-08-18
Attending: STUDENT IN AN ORGANIZED HEALTH CARE EDUCATION/TRAINING PROGRAM
Payer: COMMERCIAL

## 2025-08-18 VITALS
DIASTOLIC BLOOD PRESSURE: 83 MMHG | OXYGEN SATURATION: 93 % | HEART RATE: 102 BPM | TEMPERATURE: 98 F | BODY MASS INDEX: 17.05 KG/M2 | RESPIRATION RATE: 25 BRPM | SYSTOLIC BLOOD PRESSURE: 123 MMHG | HEIGHT: 64 IN | WEIGHT: 99.87 LBS

## 2025-08-18 DIAGNOSIS — R42 DIZZINESS: Primary | ICD-10-CM

## 2025-08-18 DIAGNOSIS — I48.91 ATRIAL FIBRILLATION WITH RAPID VENTRICULAR RESPONSE (HCC): ICD-10-CM

## 2025-08-18 LAB
ALBUMIN SERPL-MCNC: 4.4 G/DL (ref 3.5–5.2)
ALBUMIN/GLOB SERPL: 1.4 (ref 1.1–2.2)
ALP SERPL-CCNC: 56 U/L (ref 35–104)
ALT SERPL-CCNC: 33 U/L (ref 10–35)
ANION GAP SERPL CALC-SCNC: 16 MMOL/L (ref 2–14)
AST SERPL-CCNC: 47 U/L (ref 10–35)
BASOPHILS # BLD: 0.07 K/UL (ref 0–0.1)
BASOPHILS NFR BLD: 0.9 % (ref 0–1)
BILIRUB SERPL-MCNC: 0.7 MG/DL (ref 0–1.2)
BUN SERPL-MCNC: 17 MG/DL (ref 8–23)
CALCIUM SERPL-MCNC: 9.6 MG/DL (ref 8.8–10.2)
CHLORIDE SERPL-SCNC: 100 MMOL/L (ref 98–107)
CO2 SERPL-SCNC: 23 MMOL/L (ref 20–29)
COMMENT:: NORMAL
CREAT SERPL-MCNC: 0.8 MG/DL (ref 0.6–1)
DIFFERENTIAL METHOD BLD: ABNORMAL
EOSINOPHIL # BLD: 0.07 K/UL (ref 0–0.4)
EOSINOPHIL NFR BLD: 0.9 % (ref 0–7)
ERYTHROCYTE [DISTWIDTH] IN BLOOD BY AUTOMATED COUNT: 12.6 % (ref 11.5–14.5)
GLOBULIN SER CALC-MCNC: 3 G/DL (ref 2–4)
GLUCOSE SERPL-MCNC: 114 MG/DL (ref 65–100)
HCT VFR BLD AUTO: 43.8 % (ref 35–47)
HGB BLD-MCNC: 14.4 G/DL (ref 11.5–16)
IMM GRANULOCYTES # BLD AUTO: 0.02 K/UL (ref 0–0.04)
IMM GRANULOCYTES NFR BLD AUTO: 0.3 % (ref 0–0.5)
LYMPHOCYTES # BLD: 2.55 K/UL (ref 0.8–3.5)
LYMPHOCYTES NFR BLD: 33.3 % (ref 12–49)
MAGNESIUM SERPL-MCNC: 2.3 MG/DL (ref 1.6–2.4)
MCH RBC QN AUTO: 34.5 PG (ref 26–34)
MCHC RBC AUTO-ENTMCNC: 32.9 G/DL (ref 30–36.5)
MCV RBC AUTO: 105 FL (ref 80–99)
MONOCYTES # BLD: 0.65 K/UL (ref 0–1)
MONOCYTES NFR BLD: 8.5 % (ref 5–13)
NEUTS SEG # BLD: 4.29 K/UL (ref 1.8–8)
NEUTS SEG NFR BLD: 56.1 % (ref 32–75)
NRBC # BLD: 0 K/UL (ref 0–0.01)
NRBC BLD-RTO: 0 PER 100 WBC
PLATELET # BLD AUTO: 216 K/UL (ref 150–400)
PMV BLD AUTO: 10.3 FL (ref 8.9–12.9)
POTASSIUM SERPL-SCNC: 4.8 MMOL/L (ref 3.5–5.1)
PROT SERPL-MCNC: 7.4 G/DL (ref 6.4–8.3)
RBC # BLD AUTO: 4.17 M/UL (ref 3.8–5.2)
SODIUM SERPL-SCNC: 139 MMOL/L (ref 136–145)
SPECIMEN HOLD: NORMAL
TROPONIN T SERPL HS-MCNC: 10.5 NG/L (ref 0–14)
WBC # BLD AUTO: 7.7 K/UL (ref 3.6–11)

## 2025-08-18 PROCEDURE — 70450 CT HEAD/BRAIN W/O DYE: CPT

## 2025-08-18 PROCEDURE — 6360000002 HC RX W HCPCS: Performed by: STUDENT IN AN ORGANIZED HEALTH CARE EDUCATION/TRAINING PROGRAM

## 2025-08-18 PROCEDURE — 96374 THER/PROPH/DIAG INJ IV PUSH: CPT

## 2025-08-18 PROCEDURE — 99284 EMERGENCY DEPT VISIT MOD MDM: CPT

## 2025-08-18 PROCEDURE — 93005 ELECTROCARDIOGRAM TRACING: CPT | Performed by: STUDENT IN AN ORGANIZED HEALTH CARE EDUCATION/TRAINING PROGRAM

## 2025-08-18 PROCEDURE — 85025 COMPLETE CBC W/AUTO DIFF WBC: CPT

## 2025-08-18 PROCEDURE — 80053 COMPREHEN METABOLIC PANEL: CPT

## 2025-08-18 PROCEDURE — 83735 ASSAY OF MAGNESIUM: CPT

## 2025-08-18 PROCEDURE — 84484 ASSAY OF TROPONIN QUANT: CPT

## 2025-08-18 RX ORDER — METOPROLOL TARTRATE 50 MG
100 TABLET ORAL 2 TIMES DAILY
Qty: 120 TABLET | Refills: 0 | Status: SHIPPED | OUTPATIENT
Start: 2025-08-18 | End: 2025-09-17

## 2025-08-18 RX ORDER — METOPROLOL TARTRATE 1 MG/ML
5 INJECTION, SOLUTION INTRAVENOUS ONCE
Status: COMPLETED | OUTPATIENT
Start: 2025-08-18 | End: 2025-08-18

## 2025-08-18 RX ADMIN — METOPROLOL TARTRATE 5 MG: 5 INJECTION INTRAVENOUS at 17:40

## 2025-08-18 ASSESSMENT — PAIN SCALES - GENERAL
PAINLEVEL_OUTOF10: 0

## 2025-08-18 ASSESSMENT — PAIN - FUNCTIONAL ASSESSMENT: PAIN_FUNCTIONAL_ASSESSMENT: 0-10

## 2025-08-19 LAB
EKG DIAGNOSIS: NORMAL
EKG Q-T INTERVAL: 318 MS
EKG QRS DURATION: 62 MS
EKG QTC CALCULATION (BAZETT): 410 MS
EKG R AXIS: 67 DEGREES
EKG T AXIS: -67 DEGREES
EKG VENTRICULAR RATE: 100 BPM

## 2025-08-19 PROCEDURE — 93010 ELECTROCARDIOGRAM REPORT: CPT | Performed by: INTERNAL MEDICINE

## 2025-08-22 ENCOUNTER — TELEPHONE (OUTPATIENT)
Age: 80
End: 2025-08-22

## 2025-09-05 ENCOUNTER — OFFICE VISIT (OUTPATIENT)
Age: 80
End: 2025-09-05
Payer: COMMERCIAL

## 2025-09-05 VITALS
BODY MASS INDEX: 16.9 KG/M2 | HEIGHT: 64 IN | RESPIRATION RATE: 17 BRPM | OXYGEN SATURATION: 98 % | DIASTOLIC BLOOD PRESSURE: 76 MMHG | WEIGHT: 99 LBS | HEART RATE: 98 BPM | SYSTOLIC BLOOD PRESSURE: 118 MMHG

## 2025-09-05 DIAGNOSIS — Z09 HOSPITAL DISCHARGE FOLLOW-UP: ICD-10-CM

## 2025-09-05 DIAGNOSIS — Z86.73 HISTORY OF STROKE: ICD-10-CM

## 2025-09-05 DIAGNOSIS — I63.511 CEREBRAL INFARCTION DUE TO OCCLUSION OF RIGHT MIDDLE CEREBRAL ARTERY (HCC): Primary | ICD-10-CM

## 2025-09-05 DIAGNOSIS — Z79.82 LONG-TERM USE OF ASPIRIN THERAPY: ICD-10-CM

## 2025-09-05 PROCEDURE — 1123F ACP DISCUSS/DSCN MKR DOCD: CPT

## 2025-09-05 PROCEDURE — 1111F DSCHRG MED/CURRENT MED MERGE: CPT

## 2025-09-05 PROCEDURE — 99215 OFFICE O/P EST HI 40 MIN: CPT

## 2025-09-05 ASSESSMENT — PATIENT HEALTH QUESTIONNAIRE - PHQ9
SUM OF ALL RESPONSES TO PHQ QUESTIONS 1-9: 0
1. LITTLE INTEREST OR PLEASURE IN DOING THINGS: NOT AT ALL
SUM OF ALL RESPONSES TO PHQ QUESTIONS 1-9: 0
2. FEELING DOWN, DEPRESSED OR HOPELESS: NOT AT ALL

## 2025-09-05 ASSESSMENT — ENCOUNTER SYMPTOMS: PHOTOPHOBIA: 0

## 2025-09-05 ASSESSMENT — VISUAL ACUITY: VA_NORMAL: 1

## (undated) DEVICE — ASTOUND STANDARD SURGICAL GOWN, XXL: Brand: CONVERTORS

## (undated) DEVICE — PAD,NON-ADHERENT,3X4,STERILE,LF,1/PK: Brand: CURAD

## (undated) DEVICE — TOWEL,OR,DSP,ST,BLUE,STD,4/PK,20PK/CS: Brand: MEDLINE

## (undated) DEVICE — SUTURE MONOCRYL STRATAFIX SPRL + SZ 4-0 L12IN ABSRB UD PS-2 SXMP1B117

## (undated) DEVICE — 3M™ TEGADERM™ TRANSPARENT FILM DRESSING FRAME STYLE, 1626W, 4 IN X 4-3/4 IN (10 CM X 12 CM), 50/CT 4CT/CASE: Brand: 3M™ TEGADERM™

## (undated) DEVICE — SURGICAL PROCEDURE TRAY SUTURE